# Patient Record
Sex: FEMALE | Race: BLACK OR AFRICAN AMERICAN | NOT HISPANIC OR LATINO | Employment: OTHER | URBAN - METROPOLITAN AREA
[De-identification: names, ages, dates, MRNs, and addresses within clinical notes are randomized per-mention and may not be internally consistent; named-entity substitution may affect disease eponyms.]

---

## 2017-07-17 ENCOUNTER — HOSPITAL ENCOUNTER (EMERGENCY)
Facility: HOSPITAL | Age: 72
Discharge: HOME | End: 2017-07-17
Attending: EMERGENCY MEDICINE
Payer: MEDICARE

## 2017-07-17 VITALS
SYSTOLIC BLOOD PRESSURE: 163 MMHG | WEIGHT: 178 LBS | OXYGEN SATURATION: 98 % | HEART RATE: 63 BPM | RESPIRATION RATE: 18 BRPM | BODY MASS INDEX: 30.39 KG/M2 | TEMPERATURE: 98 F | DIASTOLIC BLOOD PRESSURE: 81 MMHG | HEIGHT: 64 IN

## 2017-07-17 DIAGNOSIS — G89.29 CHRONIC LEFT SHOULDER PAIN: ICD-10-CM

## 2017-07-17 DIAGNOSIS — R07.89 LEFT-SIDED CHEST WALL PAIN: ICD-10-CM

## 2017-07-17 DIAGNOSIS — M25.512 CHRONIC LEFT SHOULDER PAIN: ICD-10-CM

## 2017-07-17 DIAGNOSIS — M25.512 ACUTE PAIN OF LEFT SHOULDER: ICD-10-CM

## 2017-07-17 DIAGNOSIS — E78.5 HYPERLIPIDEMIA, UNSPECIFIED HYPERLIPIDEMIA TYPE: ICD-10-CM

## 2017-07-17 DIAGNOSIS — M54.6 LEFT-SIDED THORACIC BACK PAIN, UNSPECIFIED CHRONICITY: Primary | ICD-10-CM

## 2017-07-17 DIAGNOSIS — I10 ESSENTIAL HYPERTENSION: ICD-10-CM

## 2017-07-17 LAB
ALBUMIN SERPL BCP-MCNC: 3.9 G/DL
ALP SERPL-CCNC: 53 U/L
ALT SERPL W/O P-5'-P-CCNC: 14 U/L
ANION GAP SERPL CALC-SCNC: 11 MMOL/L
AST SERPL-CCNC: 17 U/L
BASOPHILS # BLD AUTO: 0.03 K/UL
BASOPHILS NFR BLD: 0.5 %
BILIRUB SERPL-MCNC: 0.3 MG/DL
BNP SERPL-MCNC: 198 PG/ML
BUN SERPL-MCNC: 14 MG/DL
CALCIUM SERPL-MCNC: 9.4 MG/DL
CHLORIDE SERPL-SCNC: 104 MMOL/L
CO2 SERPL-SCNC: 24 MMOL/L
CREAT SERPL-MCNC: 1 MG/DL
DIFFERENTIAL METHOD: ABNORMAL
EOSINOPHIL # BLD AUTO: 0.1 K/UL
EOSINOPHIL NFR BLD: 1.3 %
ERYTHROCYTE [DISTWIDTH] IN BLOOD BY AUTOMATED COUNT: 14.6 %
EST. GFR  (AFRICAN AMERICAN): >60 ML/MIN/1.73 M^2
EST. GFR  (NON AFRICAN AMERICAN): 56.4 ML/MIN/1.73 M^2
GLUCOSE SERPL-MCNC: 96 MG/DL
HCT VFR BLD AUTO: 38.5 %
HGB BLD-MCNC: 12.3 G/DL
LYMPHOCYTES # BLD AUTO: 1.9 K/UL
LYMPHOCYTES NFR BLD: 30.5 %
MCH RBC QN AUTO: 26.6 PG
MCHC RBC AUTO-ENTMCNC: 31.9 %
MCV RBC AUTO: 83 FL
MONOCYTES # BLD AUTO: 1 K/UL
MONOCYTES NFR BLD: 15.6 %
NEUTROPHILS # BLD AUTO: 3.3 K/UL
NEUTROPHILS NFR BLD: 51.9 %
PLATELET # BLD AUTO: 175 K/UL
PMV BLD AUTO: 10.9 FL
POTASSIUM SERPL-SCNC: 3.2 MMOL/L
PROT SERPL-MCNC: 8.1 G/DL
RBC # BLD AUTO: 4.62 M/UL
SODIUM SERPL-SCNC: 139 MMOL/L
TROPONIN I SERPL DL<=0.01 NG/ML-MCNC: 0.01 NG/ML
WBC # BLD AUTO: 6.3 K/UL

## 2017-07-17 PROCEDURE — 99284 EMERGENCY DEPT VISIT MOD MDM: CPT | Mod: 25

## 2017-07-17 PROCEDURE — 25000003 PHARM REV CODE 250: Performed by: EMERGENCY MEDICINE

## 2017-07-17 PROCEDURE — 85025 COMPLETE CBC W/AUTO DIFF WBC: CPT

## 2017-07-17 PROCEDURE — 93005 ELECTROCARDIOGRAM TRACING: CPT

## 2017-07-17 PROCEDURE — 80053 COMPREHEN METABOLIC PANEL: CPT

## 2017-07-17 PROCEDURE — 84484 ASSAY OF TROPONIN QUANT: CPT

## 2017-07-17 PROCEDURE — 83880 ASSAY OF NATRIURETIC PEPTIDE: CPT

## 2017-07-17 PROCEDURE — 36000 PLACE NEEDLE IN VEIN: CPT

## 2017-07-17 PROCEDURE — 93010 ELECTROCARDIOGRAM REPORT: CPT | Mod: ,,, | Performed by: NUCLEAR MEDICINE

## 2017-07-17 PROCEDURE — 99900035 HC TECH TIME PER 15 MIN (STAT)

## 2017-07-17 RX ORDER — MELOXICAM 7.5 MG/1
7.5 TABLET ORAL DAILY
Qty: 60 TABLET | Refills: 1 | Status: SHIPPED | OUTPATIENT
Start: 2017-07-17 | End: 2022-07-30 | Stop reason: CLARIF

## 2017-07-17 RX ORDER — ASPIRIN 325 MG
325 TABLET ORAL
Status: COMPLETED | OUTPATIENT
Start: 2017-07-17 | End: 2017-07-17

## 2017-07-17 RX ADMIN — ASPIRIN 325 MG ORAL TABLET 325 MG: 325 PILL ORAL at 06:07

## 2017-07-18 NOTE — ED PROVIDER NOTES
"Encounter Date: 7/17/2017       History     Chief Complaint   Patient presents with    Back Pain     Pt states, "I have been having alot of pain in my back, chest to my throat area, and my shoulder area. It hurts to breath in and out." Onset approx 2-3 days ago. Pain described as soreness.     Chest Pain     Patient reports a chief complaint of upper back pain with radiation to the LEFT shoulder.  CP is denied.  This back pain is isolated to the left, upper back.  There has been no trauma.  Onset was noted at 2-3 days ago.  There is no numbness, weakness, incontinence reported.  Patient has not attempted treatment at home with over-the-counter medications.  Urinary complaints are denied.          Review of patient's allergies indicates:  No Known Allergies  Past Medical History:   Diagnosis Date    Anemia     Anxiety     Bronchitis     Hyperlipidemia     Hypertension      Past Surgical History:   Procedure Laterality Date    TUBAL LIGATION      VAGINAL PROLAPSE REPAIR  09/10/2013    Uterus prolapse/ [facility]     Family History   Problem Relation Age of Onset    Stroke Brother      Social History   Substance Use Topics    Smoking status: Never Smoker    Smokeless tobacco: Never Used    Alcohol use Yes      Comment: occasional      Review of Systems   Constitutional: Negative for chills and fever.   HENT: Negative for congestion and rhinorrhea.    Respiratory: Negative for cough, chest tightness, shortness of breath and wheezing.    Cardiovascular: Negative for chest pain, palpitations and leg swelling.   Gastrointestinal: Negative for abdominal pain, constipation, diarrhea, nausea and vomiting.   Genitourinary: Negative for dysuria, frequency, urgency, vaginal bleeding and vaginal discharge.   Musculoskeletal: Positive for back pain.   Skin: Negative for color change and rash.   Allergic/Immunologic: Negative for immunocompromised state.   Neurological: Negative for dizziness, weakness and numbness. "   Hematological: Negative for adenopathy. Does not bruise/bleed easily.   All other systems reviewed and are negative.      Physical Exam     Initial Vitals   BP Pulse Resp Temp SpO2   07/17/17 1828 07/17/17 1828 07/17/17 1828 07/17/17 1829 07/17/17 1828   (!) 199/106 69 18 98.2 °F (36.8 °C) 98 %      MAP       07/17/17 1828       137         Physical Exam    Nursing note and vitals reviewed.  Constitutional: She appears well-developed and well-nourished. She is not diaphoretic. No distress.   HENT:   Head: Normocephalic and atraumatic.   Right Ear: External ear normal.   Left Ear: External ear normal.   Nose: Nose normal.   Mouth/Throat: Oropharynx is clear and moist.   Eyes: Conjunctivae and EOM are normal. Pupils are equal, round, and reactive to light. No scleral icterus.   Neck: Neck supple. No tracheal deviation present. No JVD present.   Cardiovascular: Normal rate, regular rhythm, normal heart sounds and intact distal pulses. Exam reveals no gallop and no friction rub.    No murmur heard.  Pulmonary/Chest: Breath sounds normal. No respiratory distress. She has no wheezes. She has no rhonchi. She has no rales.   Abdominal: Soft. Bowel sounds are normal. She exhibits no distension. There is no tenderness.   Musculoskeletal: Normal range of motion. She exhibits no edema.   Neurological: She is alert and oriented to person, place, and time. She has normal strength. No cranial nerve deficit or sensory deficit.   Skin: Skin is warm and dry. No rash noted.   Psychiatric: She has a normal mood and affect. Her behavior is normal.         ED Course   Procedures  Labs Reviewed   CBC W/ AUTO DIFFERENTIAL - Abnormal; Notable for the following:        Result Value    MCH 26.6 (*)     MCHC 31.9 (*)     RDW 14.6 (*)     Mono% 15.6 (*)     All other components within normal limits   B-TYPE NATRIURETIC PEPTIDE - Abnormal; Notable for the following:      (*)     All other components within normal limits   COMPREHENSIVE  METABOLIC PANEL - Abnormal; Notable for the following:     Potassium 3.2 (*)     Alkaline Phosphatase 53 (*)     eGFR if non  56.4 (*)     All other components within normal limits   TROPONIN I     EKG Readings: (Independently Interpreted)   Initial Reading: No STEMI. Rhythm: Normal Sinus Rhythm. Heart Rate: 61. Ectopy: No Ectopy. Conduction: Normal. Axis: Left Axis Deviation.          Medical Decision Making:   Differential Diagnosis:   MILAGROS Score                          Age >/= 65   Yes  >/=3 Risk Factors  No       FH CAD    No       HTN    Yes        HLD    Yes       DM     No       Current smoker   No  Known CAD   No  ASA use in past 7days No  Severe angina   No  ST elevation   No  Elevated cardiac markers No    ED Management:  All historical, clinical, radiographic, and laboratory findings were reviewed with the patient in detail.  All remaining questions and concerns were addressed at that time.  Patient has been counseled regarding the need for follow-up as well as the indication to return to the emergency room should new or worrisome developments occur.  Guanaco Sandoval MD      Imaging Results          X-Ray Chest PA And Lateral (Final result)  Result time 07/17/17 19:57:54    Final result by Mike Barclay MD (07/17/17 19:57:54)                 Impression:     No acute process.      Electronically signed by: MIKE BARCLAY  Date:     07/17/17  Time:    19:57              Narrative:    Chest x-ray 2 views    Indication:R07.89 Other chest pain;    Findings: Comparison study 7/17/2017 at 1843 hours.  Improved depth of inspiration.  The lungs are clear.  Previously seen questionable left basilar opacity has resolved.  Normal size heart.  Moderate aortic tortuosity.  No congestion.                             X-Ray Chest AP Portable (Final result)  Result time 07/17/17 19:11:45    Final result by Mike Barclay MD (07/17/17 19:11:45)                 Impression:     See  above.      Electronically signed by: MIKE GAXIOLA  Date:     07/17/17  Time:    19:11              Narrative:    Chest x-ray single view    Indication: Chest pain.    Findings: No prior study.  Shallow breath.  Normal size heart.  Questionable infiltrate or atelectasis left lung base.  Otherwise, the lungs are clear.                                             ED Course     Clinical Impression:   The primary encounter diagnosis was Left-sided thoracic back pain, unspecified chronicity. Diagnoses of Left-sided chest wall pain, Essential hypertension, Hyperlipidemia, unspecified hyperlipidemia type, Chronic left shoulder pain, and Acute pain of left shoulder were also pertinent to this visit.                           Guanaco Sandoval MD  07/18/17 0358

## 2017-07-25 ENCOUNTER — HOSPITAL ENCOUNTER (OUTPATIENT)
Dept: RADIOLOGY | Facility: HOSPITAL | Age: 72
Discharge: HOME | End: 2017-07-25
Attending: FAMILY MEDICINE
Payer: MEDICARE

## 2017-07-25 DIAGNOSIS — M54.50 LOW BACK PAIN: ICD-10-CM

## 2017-07-25 DIAGNOSIS — M54.50 LOW BACK PAIN: Primary | ICD-10-CM

## 2017-07-25 PROCEDURE — 72070 X-RAY EXAM THORAC SPINE 2VWS: CPT | Mod: 26,,, | Performed by: RADIOLOGY

## 2017-07-25 PROCEDURE — 72040 X-RAY EXAM NECK SPINE 2-3 VW: CPT | Mod: 26,,, | Performed by: RADIOLOGY

## 2017-07-25 PROCEDURE — 72040 X-RAY EXAM NECK SPINE 2-3 VW: CPT | Mod: TC,PO

## 2017-07-25 PROCEDURE — 72070 X-RAY EXAM THORAC SPINE 2VWS: CPT | Mod: TC,PO

## 2018-05-18 ENCOUNTER — HOSPITAL ENCOUNTER (OUTPATIENT)
Dept: RADIOLOGY | Facility: HOSPITAL | Age: 73
Discharge: HOME | End: 2018-05-18
Attending: INTERNAL MEDICINE
Payer: MEDICARE

## 2018-05-18 DIAGNOSIS — M25.559 HIP PAIN: ICD-10-CM

## 2018-05-18 DIAGNOSIS — M54.50 LOW BACK PAIN, NON-SPECIFIC: ICD-10-CM

## 2018-05-18 DIAGNOSIS — M25.559 HIP PAIN: Primary | ICD-10-CM

## 2018-05-18 PROCEDURE — 72100 X-RAY EXAM L-S SPINE 2/3 VWS: CPT | Mod: TC,PO

## 2018-05-18 PROCEDURE — 73521 X-RAY EXAM HIPS BI 2 VIEWS: CPT | Mod: TC,PO

## 2018-05-18 PROCEDURE — 72100 X-RAY EXAM L-S SPINE 2/3 VWS: CPT | Mod: 26,,, | Performed by: RADIOLOGY

## 2018-05-18 PROCEDURE — 73521 X-RAY EXAM HIPS BI 2 VIEWS: CPT | Mod: 26,,, | Performed by: RADIOLOGY

## 2018-08-15 DIAGNOSIS — Z01.818 PREOP EXAMINATION: Primary | ICD-10-CM

## 2018-08-21 ENCOUNTER — LAB VISIT (OUTPATIENT)
Dept: LAB | Facility: HOSPITAL | Age: 73
End: 2018-08-21
Attending: INTERNAL MEDICINE
Payer: MEDICARE

## 2018-08-21 ENCOUNTER — HOSPITAL ENCOUNTER (OUTPATIENT)
Dept: CARDIOLOGY | Facility: CLINIC | Age: 73
Discharge: HOME | End: 2018-08-21
Payer: MEDICARE

## 2018-08-21 DIAGNOSIS — Z01.818 PREOP EXAMINATION: ICD-10-CM

## 2018-08-21 DIAGNOSIS — Z01.818 PREOP EXAMINATION: Primary | ICD-10-CM

## 2018-08-21 LAB
BASOPHILS # BLD AUTO: 0.03 K/UL
BASOPHILS NFR BLD: 0.6 %
DIFFERENTIAL METHOD: ABNORMAL
EOSINOPHIL # BLD AUTO: 0.1 K/UL
EOSINOPHIL NFR BLD: 2.3 %
ERYTHROCYTE [DISTWIDTH] IN BLOOD BY AUTOMATED COUNT: 14.6 %
HCT VFR BLD AUTO: 36.3 %
HGB BLD-MCNC: 11.8 G/DL
LYMPHOCYTES # BLD AUTO: 1.5 K/UL
LYMPHOCYTES NFR BLD: 30 %
MCH RBC QN AUTO: 27 PG
MCHC RBC AUTO-ENTMCNC: 32.5 G/DL
MCV RBC AUTO: 83 FL
MONOCYTES # BLD AUTO: 0.8 K/UL
MONOCYTES NFR BLD: 15.8 %
NEUTROPHILS # BLD AUTO: 2.5 K/UL
NEUTROPHILS NFR BLD: 51.1 %
PLATELET # BLD AUTO: 187 K/UL
PMV BLD AUTO: 10.6 FL
RBC # BLD AUTO: 4.37 M/UL
WBC # BLD AUTO: 4.87 K/UL

## 2018-08-21 PROCEDURE — 36415 COLL VENOUS BLD VENIPUNCTURE: CPT | Mod: PO

## 2018-08-21 PROCEDURE — 85025 COMPLETE CBC W/AUTO DIFF WBC: CPT | Mod: PO

## 2018-08-21 PROCEDURE — 93010 ELECTROCARDIOGRAM REPORT: CPT | Mod: 76,S$GLB,, | Performed by: INTERNAL MEDICINE

## 2020-10-29 ENCOUNTER — CLINICAL SUPPORT (OUTPATIENT)
Dept: OTHER | Facility: CLINIC | Age: 75
End: 2020-10-29

## 2020-10-29 DIAGNOSIS — Z00.8 ENCOUNTER FOR OTHER GENERAL EXAMINATION: ICD-10-CM

## 2020-10-31 VITALS — BODY MASS INDEX: 31.53 KG/M2 | HEIGHT: 63 IN

## 2020-10-31 LAB
HDLC SERPL-MCNC: 56 MG/DL
POC CHOLESTEROL, LDL (DOCK): 127 MG/DL
POC CHOLESTEROL, TOTAL: 195 MG/DL
POC GLUCOSE, FASTING: 77 MG/DL (ref 60–110)
TRIGL SERPL-MCNC: 60 MG/DL

## 2021-06-26 ENCOUNTER — HOSPITAL ENCOUNTER (EMERGENCY)
Facility: HOSPITAL | Age: 76
Discharge: HOME | End: 2021-06-26
Attending: EMERGENCY MEDICINE
Payer: MEDICARE

## 2021-06-26 VITALS
DIASTOLIC BLOOD PRESSURE: 90 MMHG | HEIGHT: 64 IN | RESPIRATION RATE: 18 BRPM | TEMPERATURE: 98 F | HEART RATE: 59 BPM | OXYGEN SATURATION: 100 % | BODY MASS INDEX: 27.55 KG/M2 | SYSTOLIC BLOOD PRESSURE: 198 MMHG | WEIGHT: 161.38 LBS

## 2021-06-26 DIAGNOSIS — S00.83XA CONTUSION OF FACE, INITIAL ENCOUNTER: ICD-10-CM
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
## 2021-06-26 PROCEDURE — 99284 EMERGENCY DEPT VISIT MOD MDM: CPT | Mod: 25,ER

## 2021-10-18 ENCOUNTER — CLINICAL SUPPORT (OUTPATIENT)
Dept: OTHER | Facility: CLINIC | Age: 76
End: 2021-10-18
Payer: MEDICARE

## 2021-10-18 DIAGNOSIS — Z00.8 ENCOUNTER FOR OTHER GENERAL EXAMINATION: ICD-10-CM

## 2021-10-19 VITALS — HEIGHT: 64 IN | BODY MASS INDEX: 27.7 KG/M2

## 2021-10-19 LAB
GLUCOSE SERPL-MCNC: 76 MG/DL (ref 60–140)
HDLC SERPL-MCNC: 59 MG/DL
POC CHOLESTEROL, LDL (DOCK): 97 MG/DL
POC CHOLESTEROL, TOTAL: 167 MG/DL
TRIGL SERPL-MCNC: 52 MG/DL

## 2022-03-08 ENCOUNTER — LAB VISIT (OUTPATIENT)
Dept: LAB | Facility: HOSPITAL | Age: 77
End: 2022-03-08
Attending: INTERNAL MEDICINE
Payer: MEDICARE

## 2022-03-08 DIAGNOSIS — E78.2 MIXED HYPERLIPIDEMIA: ICD-10-CM

## 2022-03-08 DIAGNOSIS — I10 ESSENTIAL HYPERTENSION, MALIGNANT: Primary | ICD-10-CM

## 2022-03-08 DIAGNOSIS — I10 ESSENTIAL HYPERTENSION, MALIGNANT: ICD-10-CM

## 2022-03-08 LAB
ANION GAP SERPL CALC-SCNC: 9 MMOL/L (ref 8–16)
BUN SERPL-MCNC: 28 MG/DL (ref 8–23)
CALCIUM SERPL-MCNC: 9.6 MG/DL (ref 8.7–10.5)
CHLORIDE SERPL-SCNC: 103 MMOL/L (ref 95–110)
CO2 SERPL-SCNC: 29 MMOL/L (ref 23–29)
CREAT SERPL-MCNC: 1.1 MG/DL (ref 0.5–1.4)
EST. GFR  (AFRICAN AMERICAN): 56 ML/MIN/1.73 M^2
EST. GFR  (NON AFRICAN AMERICAN): 48.5 ML/MIN/1.73 M^2
GLUCOSE SERPL-MCNC: 81 MG/DL (ref 70–110)
POTASSIUM SERPL-SCNC: 3.7 MMOL/L (ref 3.5–5.1)
SODIUM SERPL-SCNC: 141 MMOL/L (ref 136–145)
TSH SERPL DL<=0.005 MIU/L-ACNC: 2.33 UIU/ML (ref 0.4–4)

## 2022-03-08 PROCEDURE — 36415 COLL VENOUS BLD VENIPUNCTURE: CPT | Mod: PO | Performed by: INTERNAL MEDICINE

## 2022-03-08 PROCEDURE — 80061 LIPID PANEL: CPT | Performed by: INTERNAL MEDICINE

## 2022-03-08 PROCEDURE — 80048 BASIC METABOLIC PNL TOTAL CA: CPT | Mod: PO | Performed by: INTERNAL MEDICINE

## 2022-03-08 PROCEDURE — 83036 HEMOGLOBIN GLYCOSYLATED A1C: CPT | Performed by: INTERNAL MEDICINE

## 2022-03-08 PROCEDURE — 84443 ASSAY THYROID STIM HORMONE: CPT | Mod: PO | Performed by: INTERNAL MEDICINE

## 2022-03-09 LAB
CHOLEST SERPL-MCNC: 173 MG/DL (ref 120–199)
CHOLEST/HDLC SERPL: 2.9 {RATIO} (ref 2–5)
ESTIMATED AVG GLUCOSE: 108 MG/DL (ref 68–131)
HBA1C MFR BLD: 5.4 % (ref 4–5.6)
HDLC SERPL-MCNC: 60 MG/DL (ref 40–75)
HDLC SERPL: 34.7 % (ref 20–50)
LDLC SERPL CALC-MCNC: 100.6 MG/DL (ref 63–159)
NONHDLC SERPL-MCNC: 113 MG/DL
TRIGL SERPL-MCNC: 62 MG/DL (ref 30–150)

## 2022-04-02 ENCOUNTER — HOSPITAL ENCOUNTER (EMERGENCY)
Facility: HOSPITAL | Age: 77
Discharge: HOME | End: 2022-04-02
Attending: EMERGENCY MEDICINE
Payer: MEDICARE

## 2022-04-02 VITALS
SYSTOLIC BLOOD PRESSURE: 139 MMHG | WEIGHT: 150 LBS | DIASTOLIC BLOOD PRESSURE: 77 MMHG | OXYGEN SATURATION: 96 % | BODY MASS INDEX: 25.75 KG/M2 | TEMPERATURE: 98 F | RESPIRATION RATE: 16 BRPM | HEART RATE: 56 BPM

## 2022-04-02 DIAGNOSIS — I10 PRIMARY HYPERTENSION: ICD-10-CM

## 2022-04-02 DIAGNOSIS — R04.0 EPISTAXIS: Primary | ICD-10-CM

## 2022-04-02 PROCEDURE — 25000003 PHARM REV CODE 250: Mod: ER | Performed by: EMERGENCY MEDICINE

## 2022-04-02 PROCEDURE — 99283 EMERGENCY DEPT VISIT LOW MDM: CPT | Mod: ER

## 2022-04-02 RX ORDER — CLONIDINE HYDROCHLORIDE 0.2 MG/1
0.2 TABLET ORAL
Status: COMPLETED | OUTPATIENT
Start: 2022-04-02 | End: 2022-04-02

## 2022-04-02 RX ADMIN — CLONIDINE HYDROCHLORIDE 0.2 MG: 0.2 TABLET ORAL at 10:04

## 2022-04-02 NOTE — ED PROVIDER NOTES
Encounter Date: 4/2/2022       History     Chief Complaint   Patient presents with    Epistaxis     Just stopped on its own. Had approx 1 nose bleed a month since Feb. Pt states it does not last too long. Just takes a daily 81mg asa.     The history is provided by the patient.   Epistaxis   This is a recurrent problem. The current episode started just prior to arrival. The problem has been resolved. The problem is associated with an unknown factor. The bleeding has been from the left nare. She has tried applying pressure for the symptoms. The treatment provided significant relief. Her past medical history is significant for hypertension and frequent nosebleeds.     Review of patient's allergies indicates:  No Known Allergies  Past Medical History:   Diagnosis Date    Anemia     Anxiety     Bronchitis     Hyperlipidemia     Hypertension      Past Surgical History:   Procedure Laterality Date    TUBAL LIGATION      VAGINAL PROLAPSE REPAIR  09/10/2013    Uterus prolapse/ [facility]     Family History   Problem Relation Age of Onset    Stroke Brother      Social History     Tobacco Use    Smoking status: Never Smoker    Smokeless tobacco: Never Used   Substance Use Topics    Alcohol use: Yes     Comment: occasional     Drug use: No     Review of Systems   Constitutional: Negative for fever.   HENT: Positive for nosebleeds. Negative for sore throat.    Respiratory: Negative for shortness of breath.    Cardiovascular: Negative for chest pain.   Gastrointestinal: Negative for nausea.   Genitourinary: Negative for dysuria.   Musculoskeletal: Negative for back pain.   Skin: Negative for rash.   Neurological: Negative for weakness.   Hematological: Does not bruise/bleed easily.       Physical Exam     Initial Vitals [04/02/22 1017]   BP Pulse Resp Temp SpO2   (!) 211/102 62 16 97.9 °F (36.6 °C) 97 %      MAP       --         Physical Exam    Nursing note and vitals reviewed.  Constitutional: She appears  well-developed and well-nourished. No distress.   HENT:   Head: Normocephalic and atraumatic.   Nose: No epistaxis.   Mouth/Throat: Oropharynx is clear and moist.   No active bleeding from either nares.   Eyes: Conjunctivae and EOM are normal. Pupils are equal, round, and reactive to light.   Neck: Neck supple.   Normal range of motion.  Cardiovascular: Normal rate, regular rhythm and normal heart sounds. Exam reveals no gallop and no friction rub.    No murmur heard.  Pulmonary/Chest: Breath sounds normal. No respiratory distress. She has no wheezes. She has no rhonchi. She has no rales.   Abdominal: Abdomen is soft. Bowel sounds are normal. She exhibits no distension and no mass. There is no abdominal tenderness. There is no rebound and no guarding.   Musculoskeletal:         General: No tenderness or edema. Normal range of motion.      Cervical back: Normal range of motion and neck supple.     Neurological: She is alert and oriented to person, place, and time. She has normal strength.   Skin: Skin is warm and dry. No rash noted.   Psychiatric: She has a normal mood and affect. Thought content normal.         ED Course   Procedures  Labs Reviewed - No data to display       Imaging Results    None          Medications   cloNIDine tablet 0.2 mg (0.2 mg Oral Given 4/2/22 1026)                          Clinical Impression:   Final diagnoses:  [R04.0] Epistaxis (Primary)  [I10] Primary hypertension          ED Disposition Condition    Discharge Stable        ED Prescriptions     None        Follow-up Information    None          Tito Chilel MD  04/02/22 7082

## 2022-06-22 ENCOUNTER — OFFICE VISIT (OUTPATIENT)
Dept: PODIATRY | Facility: CLINIC | Age: 77
End: 2022-06-22
Payer: MEDICARE

## 2022-06-22 VITALS — BODY MASS INDEX: 25.63 KG/M2 | WEIGHT: 150.13 LBS | HEIGHT: 64 IN

## 2022-06-22 DIAGNOSIS — B35.1 DERMATOPHYTOSIS OF NAIL: Primary | ICD-10-CM

## 2022-06-22 PROCEDURE — 1159F PR MEDICATION LIST DOCUMENTED IN MEDICAL RECORD: ICD-10-PCS | Mod: CPTII,S$GLB,, | Performed by: PODIATRIST

## 2022-06-22 PROCEDURE — 1159F MED LIST DOCD IN RCRD: CPT | Mod: CPTII,S$GLB,, | Performed by: PODIATRIST

## 2022-06-22 PROCEDURE — 1160F RVW MEDS BY RX/DR IN RCRD: CPT | Mod: CPTII,S$GLB,, | Performed by: PODIATRIST

## 2022-06-22 PROCEDURE — 1101F PR PT FALLS ASSESS DOC 0-1 FALLS W/OUT INJ PAST YR: ICD-10-PCS | Mod: CPTII,S$GLB,, | Performed by: PODIATRIST

## 2022-06-22 PROCEDURE — 3288F PR FALLS RISK ASSESSMENT DOCUMENTED: ICD-10-PCS | Mod: CPTII,S$GLB,, | Performed by: PODIATRIST

## 2022-06-22 PROCEDURE — 99203 OFFICE O/P NEW LOW 30 MIN: CPT | Mod: S$GLB,,, | Performed by: PODIATRIST

## 2022-06-22 PROCEDURE — 1125F AMNT PAIN NOTED PAIN PRSNT: CPT | Mod: CPTII,S$GLB,, | Performed by: PODIATRIST

## 2022-06-22 PROCEDURE — 1101F PT FALLS ASSESS-DOCD LE1/YR: CPT | Mod: CPTII,S$GLB,, | Performed by: PODIATRIST

## 2022-06-22 PROCEDURE — 99999 PR PBB SHADOW E&M-EST. PATIENT-LVL III: CPT | Mod: PBBFAC,,, | Performed by: PODIATRIST

## 2022-06-22 PROCEDURE — 99999 PR PBB SHADOW E&M-EST. PATIENT-LVL III: ICD-10-PCS | Mod: PBBFAC,,, | Performed by: PODIATRIST

## 2022-06-22 PROCEDURE — 1125F PR PAIN SEVERITY QUANTIFIED, PAIN PRESENT: ICD-10-PCS | Mod: CPTII,S$GLB,, | Performed by: PODIATRIST

## 2022-06-22 PROCEDURE — 99203 PR OFFICE/OUTPT VISIT, NEW, LEVL III, 30-44 MIN: ICD-10-PCS | Mod: S$GLB,,, | Performed by: PODIATRIST

## 2022-06-22 PROCEDURE — 3288F FALL RISK ASSESSMENT DOCD: CPT | Mod: CPTII,S$GLB,, | Performed by: PODIATRIST

## 2022-06-22 PROCEDURE — 1160F PR REVIEW ALL MEDS BY PRESCRIBER/CLIN PHARMACIST DOCUMENTED: ICD-10-PCS | Mod: CPTII,S$GLB,, | Performed by: PODIATRIST

## 2022-07-04 NOTE — PROGRESS NOTES
"Subjective:     Patient ID: Janet Hamilton is a 76 y.o. female.    Chief Complaint: Nail Problem (C/o toenail problem, rates pain 6/10, nondiabetic, wearing socks and tennis shoes, last seen PCP  on  06/07/2022.)    [place] is a 76 y.o. female who presents to the clinic complaining of thick and discolored toenails on both feet. [place] is inquiring about treatment options.    Patient Active Problem List   Diagnosis    Essential hypertension, benign    Bilateral bunions    Hyperlipemia    Overactive bladder       Medication List with Changes/Refills   Current Medications    AMLODIPINE (NORVASC) 10 MG TABLET    Take 1 tablet (10 mg total) by mouth once daily.    LOSARTAN-HYDROCHLOROTHIAZIDE 100-25 MG (HYZAAR) 100-25 MG PER TABLET    Take 1 tablet by mouth once daily.    MELOXICAM (MOBIC) 7.5 MG TABLET    Take 1 tablet (7.5 mg total) by mouth once daily at 6am.    METOPROLOL TARTRATE (LOPRESSOR) 100 MG TABLET    Take 1 tablet (100 mg total) by mouth once daily.    SIMVASTATIN (ZOCOR) 20 MG TABLET    Take 1 tablet (20 mg total) by mouth every evening.    VERAPAMIL (CALAN-SR) 240 MG CR TABLET    Take 1 tablet (240 mg total) by mouth once daily.       Review of patient's allergies indicates:  No Known Allergies    Past Surgical History:   Procedure Laterality Date    TUBAL LIGATION      VAGINAL PROLAPSE REPAIR  09/10/2013    Uterus prolapse/ [facility]       Family History   Problem Relation Age of Onset    Stroke Brother        Social History     Socioeconomic History    Marital status:      Spouse name: Solomon    Number of children: 3   Occupational History    Occupation: Retired   Tobacco Use    Smoking status: Never Smoker    Smokeless tobacco: Never Used   Substance and Sexual Activity    Alcohol use: Yes     Comment: occasional     Drug use: No    Sexual activity: Not Currently       Vitals:    06/22/22 1538   Weight: 68.1 kg (150 lb 2.1 oz)   Height: 5' 4" (1.626 m)   PainSc:   6 "   PainLoc: Toe       Hemoglobin A1C   Date Value Ref Range Status   03/08/2022 5.4 4.0 - 5.6 % Final     Comment:     ADA Screening Guidelines:  5.7-6.4%  Consistent with prediabetes  >or=6.5%  Consistent with diabetes    High levels of fetal hemoglobin interfere with the HbA1C  assay. Heterozygous hemoglobin variants (HbS, HgC, etc)do  not significantly interfere with this assay.   However, presence of multiple variants may affect accuracy.         Review of Systems   Constitutional: Negative for chills and fever.   Respiratory: Negative for shortness of breath.    Cardiovascular: Negative for chest pain, palpitations, orthopnea, claudication and leg swelling.   Gastrointestinal: Negative for diarrhea, nausea and vomiting.   Musculoskeletal: Negative for joint pain.   Skin: Negative for rash.   Neurological: Negative for dizziness, tingling, sensory change, focal weakness and weakness.   Psychiatric/Behavioral: Negative.          Objective:      PHYSICAL EXAM: Apperance: Alert and orient in no distress,well developed, and with good attention to grooming and body habits  LOWER EXTREMITY EXAM:  VASCULAR: Dorsalis pedis pulses 2/4 bilateral and Posterior Tibial pulses 2/4 bilateral. Capillary fill time <4 seconds bilateral. No edema observed bilateral. Varicosities absent bilateral. Skin temperature of the lower extremities is warm to warm, proximal to distal. Hair growth WNL bilateral.  DERMATOLOGICAL: No skin rashes, subcutaneous nodules, lesions, or ulcers observed bilateral. Nails 1,2,5 bilateral thickened, and discolored with subungual debris. Webspaces 1,2,3,4 bilateral clean, dry and without evidence of break in skin integrity.  NEUROLOGICAL: Light touch, sharp-dull, proprioception all present and equal bilaterally.     MUSCULOSKELETAL: Muscle strength is 5/5 for foot inverters, everters, plantarflexors, and dorsiflexors. Muscle tone is normal. Negative pain on palpation of nails bilateral.         Assessment:        ICD-10-CM ICD-9-CM   1. Dermatophytosis of nail  B35.1 110.1       Plan:   Dermatophytosis of nail      I counseled the patient on her conditions, regarding findings of my examination, my impressions, and usual treatment plan.   Patient instructed to spray all shoes with Lysol disinfectant spray and let dry before wearing. Patient instructed to wash all socks in hot water and bleach.  Discuss treatment options for nail fungus.  I explained that fungus lives in a warm dark moist environment and therefore patient should make every attempt to keep feet clean and dry.  We discussed drying feet thoroughly after shower particularly between the toes and then applying powder between the toes and in the shoes.    For fungal toenails I prescribed topical medication to be used daily for up to a year.  We also discussed oral Lamisil but I did not recommend it as a first line of treatment since it is an internal medicine that may potentially have side effects, including liver problems.   Patient to return as needed.          Vance Davis DPM  [facility]

## 2022-07-30 ENCOUNTER — HOSPITAL ENCOUNTER (EMERGENCY)
Facility: HOSPITAL | Age: 77
Discharge: HOME | End: 2022-07-30
Attending: EMERGENCY MEDICINE
Payer: MEDICARE

## 2022-07-30 VITALS
HEART RATE: 55 BPM | RESPIRATION RATE: 20 BRPM | OXYGEN SATURATION: 98 % | TEMPERATURE: 98 F | DIASTOLIC BLOOD PRESSURE: 88 MMHG | SYSTOLIC BLOOD PRESSURE: 178 MMHG

## 2022-07-30 DIAGNOSIS — R03.0 ELEVATED BLOOD PRESSURE READING: ICD-10-CM

## 2022-07-30 DIAGNOSIS — I10 HYPERTENSION: ICD-10-CM

## 2022-07-30 LAB
BILIRUB UR QL STRIP: NEGATIVE
CLARITY UR REFRACT.AUTO: CLEAR
COLOR UR AUTO: YELLOW
GLUCOSE UR QL STRIP: NEGATIVE
HGB UR QL STRIP: ABNORMAL
KETONES UR QL STRIP: NEGATIVE
LEUKOCYTE ESTERASE UR QL STRIP: NEGATIVE
NITRITE UR QL STRIP: NEGATIVE
PH UR STRIP: 8 [PH] (ref 5–8)
PROT UR QL STRIP: NEGATIVE
SP GR UR STRIP: 1.01 (ref 1–1.03)
URN SPEC COLLECT METH UR: ABNORMAL
UROBILINOGEN UR STRIP-ACNC: NEGATIVE EU/DL

## 2022-07-30 PROCEDURE — 81003 URINALYSIS AUTO W/O SCOPE: CPT | Mod: ER | Performed by: EMERGENCY MEDICINE

## 2022-07-30 PROCEDURE — 93005 ELECTROCARDIOGRAM TRACING: CPT | Mod: ER

## 2022-07-30 PROCEDURE — 93010 ELECTROCARDIOGRAM REPORT: CPT | Mod: ,,, | Performed by: INTERNAL MEDICINE

## 2022-07-30 PROCEDURE — 99284 EMERGENCY DEPT VISIT MOD MDM: CPT | Mod: ER

## 2022-07-30 PROCEDURE — 93010 EKG 12-LEAD: ICD-10-PCS | Mod: ,,, | Performed by: INTERNAL MEDICINE

## 2022-07-30 NOTE — ED NOTES
Spoke with several family members and friends via phone per pts request. 2 friends at bedside at this time.

## 2022-07-30 NOTE — ED PROVIDER NOTES
History     Chief Complaint   Patient presents with    Hypertension     Pts  passed away this morning at home--pts blood pressure was elevated. Denies CP, SOB or symptoms. Pt took all home medications before coming to ER. Tearful.        Review of patient's allergies indicates:  No Known Allergies    History of Present Illness   HPI    7/30/2022, 9:01 AM   The history is provided by the patient    Janet Hamilton is a 76 y.o. female presenting to the ED for elevated blood pressure reading.  Patient's spouse had passed away in sleep.  [facility] service was on scene.  Reportedly she had systolic blood pressure of 220.  She had taken her home blood pressure medications.  She states emotionally she is heart broken.  But physically she is okay.  Patient denies any numbness or tingling, headache, chest pain, chest pressure, shortness of breath, difficulty breathing, indigestion, back pain, abdominal pain, balance issues, difficulty swallowing, difficulty talking.       Arrival mode:  [facility]    PCP: Omid Morris MD     Allergies:  Review of patient's allergies indicates:  No Known Allergies    Past Medical History:  Past Medical History:   Diagnosis Date    Anemia     Anxiety     Bronchitis     Enlarged heart     Hyperlipidemia     Hypertension        Past Surgical History:  Past Surgical History:   Procedure Laterality Date    TUBAL LIGATION      VAGINAL PROLAPSE REPAIR  09/10/2013    Uterus prolapse/ [facility]         Family History:  Family History   Problem Relation Age of Onset    Stroke Brother        Social History:  Social History     Tobacco Use    Smoking status: Never Smoker    Smokeless tobacco: Never Used   Substance and Sexual Activity    Alcohol use: Yes     Comment: occasional     Drug use: No    Sexual activity: Not Currently        Review of Systems   Review of Systems   Constitutional: Negative for fever.   HENT: Negative for sore throat.    Respiratory: Negative  for chest tightness and shortness of breath.    Cardiovascular: Negative for chest pain.   Gastrointestinal: Negative for nausea.   Genitourinary: Positive for difficulty urinating. Negative for decreased urine volume, dysuria, frequency and urgency.   Musculoskeletal: Negative for back pain.   Skin: Negative for rash.   Neurological: Negative for syncope, facial asymmetry, speech difficulty, weakness, light-headedness, numbness and headaches.   Hematological: Does not bruise/bleed easily.   Psychiatric/Behavioral:        (+) sad          Physical Exam     Initial Vitals   BP Pulse Resp Temp SpO2   07/30/22 0853 07/30/22 0853 07/30/22 0853 07/30/22 0927 07/30/22 0853   (!) 197/94 61 20 98.1 °F (36.7 °C) 98 %      MAP       --                 Physical Exam    Nursing Notes and Vital Signs Reviewed.  Constitutional: Patient is in no apparent distress. Well-developed and well-nourished.  Head: Atraumatic. Normocephalic.  Eyes: PERRL. EOM intact. Conjunctivae are not pale. No scleral icterus.  ENT: Mucous membranes are moist. Oropharynx is clear and symmetric.    Neck: Supple. Full ROM. No lymphadenopathy.  Cardiovascular: Regular rate. Regular rhythm. No murmurs, rubs, or gallops. Distal pulses are 2+ and symmetric.  Pulmonary/Chest: No respiratory distress. Clear to auscultation bilaterally. No wheezing or rales.  Abdominal: Soft and non-distended.  There is no tenderness.  No rebound, guarding, or rigidity. Good bowel sounds.  Genitourinary: No CVA tenderness  Musculoskeletal: Moves all extremities. No obvious deformities. No edema. No calf tenderness.  Skin: Warm and dry.  Neurological:  Alert, awake, and appropriate.  Normal speech.  No acute focal neurological deficits are appreciated.  Cranial nerves 2-12 intact.  No focal deficits.  NIH Stroke Scale equals 0.   Psychiatric: Normal affect. Good eye contact. Appropriate in content.     ED Course     ED Procedures:  Procedures    ED Vital Signs:  Vitals:     07/30/22 0853 07/30/22 0904 07/30/22 0927 07/30/22 1035   BP: (!) 197/94   (!) 178/88   Pulse: 61 61  (!) 55   Resp: 20   20   Temp:   98.1 °F (36.7 °C)    TempSrc:   Oral    SpO2: 98%   98%       Abnormal Lab Results:  Labs Reviewed   URINALYSIS, REFLEX TO URINE CULTURE - Abnormal; Notable for the following components:       Result Value    Occult Blood UA Trace (*)     All other components within normal limits    Narrative:     Specimen Source->Urine        All Lab Results:  Results for orders placed or performed during the hospital encounter of 07/30/22   Urinalysis, Reflex to Urine Culture Urine, Clean Catch    Specimen: Urine   Result Value Ref Range    Specimen UA Urine, Clean Catch     Color, UA Yellow Yellow, Straw, [place]    Appearance, UA Clear Clear    pH, UA 8.0 5.0 - 8.0    Specific Gravity, UA 1.010 1.005 - 1.030    Protein, UA Negative Negative    Glucose, UA Negative Negative    Ketones, UA Negative Negative    Bilirubin (UA) Negative Negative    Occult Blood UA Trace (A) Negative    Nitrite, UA Negative Negative    Urobilinogen, UA Negative <2.0 EU/dL    Leukocytes, UA Negative Negative         The EKG was ordered, reviewed, and independently interpreted by the ED provider.    ECG Results          EKG 12-lead (Preliminary result)  Result time 07/30/22 09:28:40    ED Interpretation by Kymberly Peck DO (07/30/22 09:28:40, [facility] Emergency Dept, Emergency Medicine)    EKG:  Rate of 68 beats per minute.  Incomplete right bundle-branch block.  Left axis deviation.  No ST segment elevation.  No STEMI.  This is compared to August 21, 2018 no acute changes.                              Imaging Results:  Imaging Results    None               The Emergency Provider reviewed the vital signs and test results, which are outlined above.     ED Discussion      10:40 AM Reassessment: Dr. Peck reassessed the pt.  The pt is resting comfortably and is NAD.  Pt states their sx have improved. Discussed  "test results, shared treatment plan, specific conditions for return, and the need for f/u.  Answered their questions at this time.  Pt understands and agrees to the plan.  The pt has remained hemodynamically stable through ED course and is stable for discharge.      I discussed with patient and/or family/caretaker that evaluation in the ED does not suggest any emergent or life threatening medical conditions requiring immediate intervention beyond what was provided in the ED, and I believe patient is safe for discharge.  Regardless, an unremarkable evaluation in the ED does not preclude the development or presence of a serious of life threatening condition. As such, patient was instructed to return immediately for any worsening or change in current symptoms.      ED Medication(s):  Medications - No data to display          MIPS Measures     Smoker? No     Hypertension: History of Hypertension: The patient has elevated blood pressure (higher than 120/80) while being treated in the ED but has a history of hypertension.       Medical Decision Making           Additional MDM:     NIH Stroke Scale:   Interval = baseline (upon arrival/admit)  Level of consciousness = 0 - alert  LOC questions = 0 - answers both correctly  LOC commands = 0 - performs both correctly  Best gaze = 0 - normal  Visual = 0 - no visual loss  Facial palsy = 0 - normal  Motor left arm =  0 - no drift  Motor right arm =  0 - no drift  Motor left leg = 0 - no drift  Motor right leg =  0 - no drift  Limb ataxia = 0 - absent  Sensory = 0 - normal  Best language = 0 - no aphasia  Dysarthria = 0 - normal articulation  Extinction and inattention = 0 - no neglect  NIH Stroke Scale Total = 0       MDM  Reviewed: vitals and nursing note  Interpretation: labs and ECG        Portions of this note may have been created with voice recognition software. Occasional "wrong-word" or "sound-a-like" substitutions may have occurred due to the inherent limitations of " voice recognition software. Please, read the note carefully and recognize, using context, where substitutions have occurred.            Clinical Impression       ICD-10-CM ICD-9-CM   1. Elevated blood pressure reading  R03.0 796.2   2. Hypertension  I10 401.9         ED Disposition       Disposition: Discharge to home  Patient condition: Stable    Medication List     Medication List      ASK your doctor about these medications    losartan-hydrochlorothiazide 100-25 mg 100-25 mg per tablet  Commonly known as: HYZAAR  Take 1 tablet by mouth once daily.     metoprolol tartrate 100 MG tablet  Commonly known as: LOPRESSOR  Take 1 tablet (100 mg total) by mouth once daily.     verapamiL 240 MG CR tablet  Commonly known as: CALAN-SR  Take 1 tablet (240 mg total) by mouth once daily.            ED Follow-up   Follow-up Information     Omid Morris MD In 2 days.    Specialty: Pathology  Why: Return to the emergency department for:  Chest pain, chest pressure, shortness of breath, difficulty breathing, numbness or weakness to 1 side, blurred vision, or other concerns.  Contact information:  [address] [place] [id number] [phone]                                  Kymberly Peck,   07/30/22 3195

## 2022-08-12 ENCOUNTER — HOSPITAL ENCOUNTER (EMERGENCY)
Facility: HOSPITAL | Age: 77
Discharge: HOME | End: 2022-08-12
Attending: EMERGENCY MEDICINE
Payer: MEDICARE

## 2022-08-12 VITALS
HEART RATE: 52 BPM | SYSTOLIC BLOOD PRESSURE: 194 MMHG | RESPIRATION RATE: 16 BRPM | OXYGEN SATURATION: 100 % | WEIGHT: 150 LBS | TEMPERATURE: 98 F | DIASTOLIC BLOOD PRESSURE: 87 MMHG | BODY MASS INDEX: 25.75 KG/M2

## 2022-08-12 DIAGNOSIS — I10 ESSENTIAL HYPERTENSION: ICD-10-CM

## 2022-08-12 DIAGNOSIS — R04.0 EPISTAXIS: Primary | ICD-10-CM

## 2022-08-12 PROCEDURE — 25000003 PHARM REV CODE 250: Mod: ER | Performed by: EMERGENCY MEDICINE

## 2022-08-12 PROCEDURE — 99283 EMERGENCY DEPT VISIT LOW MDM: CPT | Mod: 25,ER

## 2022-08-12 RX ORDER — LOSARTAN POTASSIUM 25 MG/1
100 TABLET ORAL
Status: COMPLETED | OUTPATIENT
Start: 2022-08-12 | End: 2022-08-12

## 2022-08-12 RX ORDER — METOPROLOL TARTRATE 25 MG/1
100 TABLET, FILM COATED ORAL
Status: COMPLETED | OUTPATIENT
Start: 2022-08-12 | End: 2022-08-12

## 2022-08-12 RX ORDER — HYDROCHLOROTHIAZIDE 25 MG/1
25 TABLET ORAL
Status: COMPLETED | OUTPATIENT
Start: 2022-08-12 | End: 2022-08-12

## 2022-08-12 RX ADMIN — METOPROLOL TARTRATE 100 MG: 25 TABLET, FILM COATED ORAL at 09:08

## 2022-08-12 RX ADMIN — HYDROCHLOROTHIAZIDE 25 MG: 25 TABLET ORAL at 09:08

## 2022-08-12 RX ADMIN — LOSARTAN POTASSIUM 100 MG: 25 TABLET, FILM COATED ORAL at 09:08

## 2022-08-12 NOTE — ED PROVIDER NOTES
Encounter Date: 8/12/2022       History     Chief Complaint   Patient presents with    Epistaxis     Left nare bleeding x20 min pta. No active bleeding on arrival. Has not taken HTN meds today, denies blood thinners.     Patient is 76-year-old female who presents today with complaints of epistaxis.  20 minutes.  Left nostril.  One prior episode.  Denies anticoagulation.  Patient does report a history of hypertension and has not taken her medications today.  She has been under significant stress.  Her  passed away this week.  Denies any chest pain, shortness of breath, and all other symptoms.        Review of patient's allergies indicates:  No Known Allergies  Past Medical History:   Diagnosis Date    Anemia     Anxiety     Bronchitis     Enlarged heart     Hyperlipidemia     Hypertension      Past Surgical History:   Procedure Laterality Date    TUBAL LIGATION      VAGINAL PROLAPSE REPAIR  09/10/2013    Uterus prolapse/ [facility]     Family History   Problem Relation Age of Onset    Stroke Brother      Social History     Tobacco Use    Smoking status: Never Smoker    Smokeless tobacco: Never Used   Substance Use Topics    Alcohol use: Yes     Comment: occasional     Drug use: No     Review of Systems   Constitutional: Negative for chills, diaphoresis and fever.   HENT: Negative for congestion, rhinorrhea and sore throat.         Epistaxis   Eyes: Negative for pain, redness and visual disturbance.   Respiratory: Negative for cough and shortness of breath.    Cardiovascular: Negative for chest pain, palpitations and leg swelling.   Gastrointestinal: Negative for abdominal distention, abdominal pain, blood in stool, constipation, diarrhea, nausea and vomiting.   Genitourinary: Negative for dysuria and hematuria.   Musculoskeletal: Negative for arthralgias and joint swelling.   Skin: Negative for rash and wound.   Neurological: Negative for seizures, syncope and headaches.   All other systems  reviewed and are negative.      Physical Exam     Initial Vitals [08/12/22 0837]   BP Pulse Resp Temp SpO2   (!) 216/100 62 16 97.8 °F (36.6 °C) 100 %      MAP       --         Physical Exam    Nursing note and vitals reviewed.  Constitutional: She appears well-developed and well-nourished. No distress.   HENT:   Head: Normocephalic and atraumatic.   Dry blood from left nostril.  No active bleeding   Eyes: Conjunctivae and EOM are normal. Pupils are equal, round, and reactive to light.   Neck: Neck supple. No tracheal deviation present.   Cardiovascular: Normal rate, regular rhythm, normal heart sounds and intact distal pulses.   Pulmonary/Chest: Breath sounds normal. No respiratory distress.   Abdominal: Abdomen is soft. She exhibits no distension. There is no abdominal tenderness. There is no rebound and no guarding.   Musculoskeletal:         General: No tenderness or edema. Normal range of motion.      Cervical back: Neck supple.     Neurological: She is alert and oriented to person, place, and time. GCS score is 15. GCS eye subscore is 4. GCS verbal subscore is 5. GCS motor subscore is 6.   No focal deficits   Skin: Skin is warm. No rash noted. No erythema.   Psychiatric: She has a normal mood and affect. Her behavior is normal.         ED Course   Procedures  Labs Reviewed - No data to display       Imaging Results    None          Medications   losartan tablet 100 mg (100 mg Oral Given 8/12/22 0914)   hydroCHLOROthiazide tablet 25 mg (25 mg Oral Given 8/12/22 0913)   metoprolol tartrate (LOPRESSOR) tablet 100 mg (100 mg Oral Given 8/12/22 0914)     Vitals:    08/12/22 0837 08/12/22 0904 08/12/22 0947   BP: (!) 216/100 (!) 191/83 (!) 194/87   BP Location: Left arm     Patient Position: Sitting     Pulse: 62 (!) 56 (!) 52   Resp: 16     Temp: 97.8 °F (36.6 °C)     TempSrc: Oral     SpO2: 100% 100% 100%   Weight: 68 kg (150 lb)                                Clinical Impression:   Final diagnoses:  [R04.0]  Epistaxis (Primary)  [I10] Essential hypertension          ED Disposition Condition    Discharge Stable        ED Prescriptions     None        Follow-up Information     Follow up With Specialties Details Why Contact Info    Omid Morris MD Pathology Call   [address] [facility] [address]  [facility] [address]  [phone]      [facility] - Emergency Dept Emergency Medicine  As needed, If symptoms worsen [address] [facility] [address]  [facility] [id number] [phone]           Reid Riley MD  08/12/22 7015

## 2022-09-23 ENCOUNTER — LAB VISIT (OUTPATIENT)
Dept: LAB | Facility: HOSPITAL | Age: 77
End: 2022-09-23
Attending: INTERNAL MEDICINE
Payer: MEDICARE

## 2022-09-23 DIAGNOSIS — I10 ESSENTIAL HYPERTENSION, MALIGNANT: ICD-10-CM

## 2022-09-23 DIAGNOSIS — E78.2 MIXED HYPERLIPIDEMIA: ICD-10-CM

## 2022-09-23 DIAGNOSIS — R73.03 DIABETES MELLITUS, LATENT: ICD-10-CM

## 2022-09-23 DIAGNOSIS — I10 ESSENTIAL HYPERTENSION, MALIGNANT: Primary | ICD-10-CM

## 2022-09-23 LAB
ANION GAP SERPL CALC-SCNC: 8 MMOL/L (ref 8–16)
BUN SERPL-MCNC: 19 MG/DL (ref 8–23)
CALCIUM SERPL-MCNC: 9.6 MG/DL (ref 8.7–10.5)
CHLORIDE SERPL-SCNC: 106 MMOL/L (ref 95–110)
CHOLEST SERPL-MCNC: 180 MG/DL (ref 120–199)
CHOLEST/HDLC SERPL: 2.6 {RATIO} (ref 2–5)
CO2 SERPL-SCNC: 29 MMOL/L (ref 23–29)
CREAT SERPL-MCNC: 1.1 MG/DL (ref 0.5–1.4)
EST. GFR  (NO RACE VARIABLE): 51.8 ML/MIN/1.73 M^2
ESTIMATED AVG GLUCOSE: 105 MG/DL (ref 68–131)
GLUCOSE SERPL-MCNC: 88 MG/DL (ref 70–110)
HBA1C MFR BLD: 5.3 % (ref 4–5.6)
HDLC SERPL-MCNC: 69 MG/DL (ref 40–75)
HDLC SERPL: 38.3 % (ref 20–50)
LDLC SERPL CALC-MCNC: 103.6 MG/DL (ref 63–159)
NONHDLC SERPL-MCNC: 111 MG/DL
POTASSIUM SERPL-SCNC: 3.8 MMOL/L (ref 3.5–5.1)
SODIUM SERPL-SCNC: 143 MMOL/L (ref 136–145)
TRIGL SERPL-MCNC: 37 MG/DL (ref 30–150)
TSH SERPL DL<=0.005 MIU/L-ACNC: 2.52 UIU/ML (ref 0.4–4)

## 2022-09-23 PROCEDURE — 84443 ASSAY THYROID STIM HORMONE: CPT | Mod: PO | Performed by: INTERNAL MEDICINE

## 2022-09-23 PROCEDURE — 80048 BASIC METABOLIC PNL TOTAL CA: CPT | Mod: PO | Performed by: INTERNAL MEDICINE

## 2022-09-23 PROCEDURE — 36415 COLL VENOUS BLD VENIPUNCTURE: CPT | Mod: PO | Performed by: INTERNAL MEDICINE

## 2022-09-23 PROCEDURE — 80061 LIPID PANEL: CPT | Performed by: INTERNAL MEDICINE

## 2022-09-23 PROCEDURE — 83036 HEMOGLOBIN GLYCOSYLATED A1C: CPT | Performed by: INTERNAL MEDICINE

## 2022-09-24 ENCOUNTER — HOSPITAL ENCOUNTER (EMERGENCY)
Facility: HOSPITAL | Age: 77
Discharge: HOME | End: 2022-09-24
Attending: EMERGENCY MEDICINE
Payer: MEDICARE

## 2022-09-24 VITALS
RESPIRATION RATE: 19 BRPM | WEIGHT: 167 LBS | HEART RATE: 64 BPM | BODY MASS INDEX: 28.67 KG/M2 | OXYGEN SATURATION: 99 % | DIASTOLIC BLOOD PRESSURE: 88 MMHG | SYSTOLIC BLOOD PRESSURE: 189 MMHG | TEMPERATURE: 99 F

## 2022-09-24 DIAGNOSIS — R40.0 SOMNOLENCE: ICD-10-CM

## 2022-09-24 DIAGNOSIS — T42.4X5A: Primary | ICD-10-CM

## 2022-09-24 LAB
ALBUMIN SERPL BCP-MCNC: 3.7 G/DL (ref 3.5–5.2)
ALP SERPL-CCNC: 45 U/L (ref 55–135)
ALT SERPL W/O P-5'-P-CCNC: 22 U/L (ref 10–44)
ANION GAP SERPL CALC-SCNC: 10 MMOL/L (ref 8–16)
AST SERPL-CCNC: 19 U/L (ref 10–40)
BASOPHILS # BLD AUTO: 0.07 K/UL (ref 0–0.2)
BASOPHILS NFR BLD: 1.4 % (ref 0–1.9)
BILIRUB SERPL-MCNC: 0.3 MG/DL (ref 0.1–1)
BUN SERPL-MCNC: 26 MG/DL (ref 8–23)
CALCIUM SERPL-MCNC: 9.4 MG/DL (ref 8.7–10.5)
CHLORIDE SERPL-SCNC: 106 MMOL/L (ref 95–110)
CO2 SERPL-SCNC: 27 MMOL/L (ref 23–29)
CREAT SERPL-MCNC: 1.2 MG/DL (ref 0.5–1.4)
DIFFERENTIAL METHOD: ABNORMAL
EOSINOPHIL # BLD AUTO: 0.1 K/UL (ref 0–0.5)
EOSINOPHIL NFR BLD: 1.6 % (ref 0–8)
ERYTHROCYTE [DISTWIDTH] IN BLOOD BY AUTOMATED COUNT: 16.6 % (ref 11.5–14.5)
EST. GFR  (NO RACE VARIABLE): 46.6 ML/MIN/1.73 M^2
GLUCOSE SERPL-MCNC: 74 MG/DL (ref 70–110)
HCT VFR BLD AUTO: 31.7 % (ref 37–48.5)
HGB BLD-MCNC: 10 G/DL (ref 12–16)
IMM GRANULOCYTES # BLD AUTO: 0 K/UL (ref 0–0.04)
IMM GRANULOCYTES NFR BLD AUTO: 0 % (ref 0–0.5)
LYMPHOCYTES # BLD AUTO: 1.4 K/UL (ref 1–4.8)
LYMPHOCYTES NFR BLD: 28.5 % (ref 18–48)
MCH RBC QN AUTO: 27.6 PG (ref 27–31)
MCHC RBC AUTO-ENTMCNC: 31.5 G/DL (ref 32–36)
MCV RBC AUTO: 88 FL (ref 82–98)
MONOCYTES # BLD AUTO: 0.9 K/UL (ref 0.3–1)
MONOCYTES NFR BLD: 17.8 % (ref 4–15)
NEUTROPHILS # BLD AUTO: 2.5 K/UL (ref 1.8–7.7)
NEUTROPHILS NFR BLD: 50.7 % (ref 38–73)
NRBC BLD-RTO: 0 /100 WBC
PLATELET # BLD AUTO: 198 K/UL (ref 150–450)
PMV BLD AUTO: 10.2 FL (ref 9.2–12.9)
POCT GLUCOSE: 76 MG/DL (ref 70–110)
POTASSIUM SERPL-SCNC: 3.5 MMOL/L (ref 3.5–5.1)
PROT SERPL-MCNC: 7.4 G/DL (ref 6–8.4)
RBC # BLD AUTO: 3.62 M/UL (ref 4–5.4)
SODIUM SERPL-SCNC: 143 MMOL/L (ref 136–145)
TROPONIN I SERPL DL<=0.01 NG/ML-MCNC: 0.01 NG/ML (ref 0–0.03)
TSH SERPL DL<=0.005 MIU/L-ACNC: 1.37 UIU/ML (ref 0.4–4)
WBC # BLD AUTO: 5.01 K/UL (ref 3.9–12.7)

## 2022-09-24 PROCEDURE — 80053 COMPREHEN METABOLIC PANEL: CPT | Mod: ER | Performed by: EMERGENCY MEDICINE

## 2022-09-24 PROCEDURE — 84443 ASSAY THYROID STIM HORMONE: CPT | Mod: ER | Performed by: EMERGENCY MEDICINE

## 2022-09-24 PROCEDURE — 99284 EMERGENCY DEPT VISIT MOD MDM: CPT | Mod: 25,ER

## 2022-09-24 PROCEDURE — 82962 GLUCOSE BLOOD TEST: CPT | Mod: ER

## 2022-09-24 PROCEDURE — 93010 EKG 12-LEAD: ICD-10-PCS | Mod: ,,, | Performed by: INTERNAL MEDICINE

## 2022-09-24 PROCEDURE — 84484 ASSAY OF TROPONIN QUANT: CPT | Mod: ER | Performed by: EMERGENCY MEDICINE

## 2022-09-24 PROCEDURE — 85025 COMPLETE CBC W/AUTO DIFF WBC: CPT | Mod: ER | Performed by: EMERGENCY MEDICINE

## 2022-09-24 PROCEDURE — 93010 ELECTROCARDIOGRAM REPORT: CPT | Mod: ,,, | Performed by: INTERNAL MEDICINE

## 2022-09-24 PROCEDURE — 25000003 PHARM REV CODE 250: Mod: ER | Performed by: EMERGENCY MEDICINE

## 2022-09-24 RX ORDER — LORAZEPAM 0.5 MG/1
TABLET ORAL
COMMUNITY
End: 2022-09-24 | Stop reason: SDUPTHER

## 2022-09-24 RX ORDER — LORAZEPAM 0.5 MG/1
0.25 TABLET ORAL NIGHTLY PRN
Qty: 15 TABLET | Refills: 0 | Status: SHIPPED | OUTPATIENT
Start: 2022-09-24 | End: 2022-10-24

## 2022-09-24 RX ORDER — ESTRADIOL 0.1 MG/G
CREAM VAGINAL
COMMUNITY

## 2022-09-24 RX ORDER — HYDRALAZINE HYDROCHLORIDE 25 MG/1
50 TABLET, FILM COATED ORAL EVERY 12 HOURS
COMMUNITY

## 2022-09-24 RX ORDER — HYDROCHLOROTHIAZIDE 25 MG/1
25 TABLET ORAL
Status: COMPLETED | OUTPATIENT
Start: 2022-09-24 | End: 2022-09-24

## 2022-09-24 RX ADMIN — HYDROCHLOROTHIAZIDE 25 MG: 25 TABLET ORAL at 05:09

## 2022-09-24 NOTE — ED PROVIDER NOTES
Encounter Date: 9/24/2022       History     Chief Complaint   Patient presents with    Altered Mental Status     Family noticed pt lethargic today. Pt seemed disoriented earlier today per daughter. Pt oriented to self, month, and name. Does not know what is going on. Pt admits she may have taken newly prescribed sleeping pill this morning.      Janet Hamilton is a 76 y.o. female who presents with 1 day of gradual onset, constant, worsening severe fatigue/somnolence at home with slurred speech after possibly taking her new lorazepam sleep/anxiety medication (0.5 mg) in the morning by accident.  She has no other complaints.  She has had no prior episodes.             Review of patient's allergies indicates:  No Known Allergies  Past Medical History:   Diagnosis Date    Anemia     Anxiety     Bronchitis     Enlarged heart     Hyperlipidemia     Hypertension      Past Surgical History:   Procedure Laterality Date    TUBAL LIGATION      VAGINAL PROLAPSE REPAIR  09/10/2013    Uterus prolapse/ [facility]     Family History   Problem Relation Age of Onset    Stroke Brother      Social History     Tobacco Use    Smoking status: Never    Smokeless tobacco: Never   Substance Use Topics    Alcohol use: Yes     Comment: occasional     Drug use: No     Review of Systems   Constitutional: Negative.  Negative for fever.   HENT: Negative.     Eyes: Negative.    Respiratory: Negative.  Negative for shortness of breath.    Cardiovascular: Negative.  Negative for chest pain.   Gastrointestinal: Negative.    Endocrine: Negative.    Genitourinary: Negative.    Musculoskeletal: Negative.    Skin: Negative.    Allergic/Immunologic: Negative.    Neurological: Negative.    Hematological: Negative.    Psychiatric/Behavioral: Negative.     All other systems reviewed and are negative.    Physical Exam     Initial Vitals   BP Pulse Resp Temp SpO2   09/24/22 1604 09/24/22 1604 09/24/22 1604 09/24/22 1618 09/24/22 1604   (!) 168/80 68 18 98.5  °F (36.9 °C) 98 %      MAP       --                Physical Exam    Nursing note and vitals reviewed.  Constitutional: She appears well-developed and well-nourished. No distress.   HENT:   Head: Normocephalic and atraumatic.   Bidirectional horizontal nystagmus   Eyes: Conjunctivae and EOM are normal. Pupils are equal, round, and reactive to light.   Neck: Neck supple.   Cardiovascular:  Normal rate, regular rhythm, normal heart sounds and intact distal pulses.           Pulmonary/Chest: Breath sounds normal. No respiratory distress.   Decreased RR   Abdominal: Abdomen is soft. She exhibits no distension. There is no abdominal tenderness.   Musculoskeletal:         General: Normal range of motion.      Cervical back: Neck supple.     Neurological: She is oriented to person, place, and time. She has normal strength.   Somnolence but oriented x3 and easily arousable, pleasant. Long latency of speech and reaction, mildly slurred speech   Skin: Skin is warm and dry. Capillary refill takes less than 2 seconds.   Psychiatric: She has a normal mood and affect.       ED Course   Procedures  Labs Reviewed   CBC W/ AUTO DIFFERENTIAL - Abnormal; Notable for the following components:       Result Value    RBC 3.62 (*)     Hemoglobin 10.0 (*)     Hematocrit 31.7 (*)     MCHC 31.5 (*)     RDW 16.6 (*)     Mono % 17.8 (*)     All other components within normal limits   COMPREHENSIVE METABOLIC PANEL - Abnormal; Notable for the following components:    BUN 26 (*)     Alkaline Phosphatase 45 (*)     eGFR 46.6 (*)     All other components within normal limits   TROPONIN I   TSH   POCT GLUCOSE     EKG Interpretation by Emergency Physician: Waqas Lebron MD  Rhythm: sinus bradycardia   Rate: 57 bpm  Nonspecific ST changes. No STEMI.   Incomplete RBBB.       Imaging Results    None          Medications   hydroCHLOROthiazide tablet 25 mg (25 mg Oral Given 9/24/22 7790)                  No results found for this or any previous visit  (from the past 24 hour(s)).    Patient's evaluation in the ED does not suggest any emergent or life threatening medical conditions requiring immediate intervention beyond what was provided in the ED, and I believe patient is safe for discharge.  Regardless, an unremarkable evaluation in the ED does not preclude the development or presence of a serious or life threatening condition. As such, patient was given return instructions for any change or worsening in symptoms.              Clinical Impression:   Final diagnoses:  [R40.0] Somnolence  [T42.4X5A] Benzodiazepine causing adverse effect in therapeutic use, initial encounter (Primary)        ED Disposition Condition    Discharge Stable          ED Prescriptions       Medication Sig Dispense Start Date End Date Auth. Provider    LORazepam (ATIVAN) 0.5 MG tablet Take 0.5 tablets (0.25 mg total) by mouth nightly as needed (insomnia/anxiety). 15 tablet 9/24/2022 10/24/2022 Waqas Lebron MD          Follow-up Information       Follow up With Specialties Details Why Contact Info    Omid Morris MD Pathology Schedule an appointment as soon as possible for a visit   [address] [place] [id number]  [phone]      [facility] - Emergency Dept Emergency Medicine  As needed, If symptoms worsen [id number] [facility] [address]  [facility] [id number] [phone]             Waqas Lebron MD  09/25/22 2674

## 2023-08-28 ENCOUNTER — TELEPHONE (OUTPATIENT)
Dept: PODIATRY | Facility: CLINIC | Age: 78
End: 2023-08-28
Payer: MEDICARE

## 2023-09-15 ENCOUNTER — APPOINTMENT (OUTPATIENT)
Dept: RADIOLOGY | Facility: HOSPITAL | Age: 78
End: 2023-09-15
Attending: INTERNAL MEDICINE
Payer: MEDICARE

## 2023-09-15 DIAGNOSIS — Z78.0 ASYMPTOMATIC MENOPAUSAL STATE: ICD-10-CM

## 2023-09-15 PROCEDURE — 77080 DXA BONE DENSITY AXIAL SKELETON 1 OR MORE SITES: ICD-10-PCS | Mod: 26,,, | Performed by: RADIOLOGY

## 2023-09-15 PROCEDURE — 77080 DXA BONE DENSITY AXIAL: CPT | Mod: 26,,, | Performed by: RADIOLOGY

## 2023-09-15 PROCEDURE — 77080 DXA BONE DENSITY AXIAL: CPT | Mod: TC

## 2023-10-09 ENCOUNTER — OFFICE VISIT (OUTPATIENT)
Dept: PODIATRY | Facility: CLINIC | Age: 78
End: 2023-10-09
Payer: MEDICARE

## 2023-10-09 VITALS — HEIGHT: 64 IN | BODY MASS INDEX: 28.53 KG/M2 | WEIGHT: 167.13 LBS

## 2023-10-09 DIAGNOSIS — M20.12 VALGUS DEFORMITY OF BOTH GREAT TOES: ICD-10-CM

## 2023-10-09 DIAGNOSIS — M20.11 VALGUS DEFORMITY OF BOTH GREAT TOES: ICD-10-CM

## 2023-10-09 DIAGNOSIS — B35.1 DERMATOPHYTOSIS OF NAIL: Primary | ICD-10-CM

## 2023-10-09 PROCEDURE — 1159F MED LIST DOCD IN RCRD: CPT | Mod: CPTII,S$GLB,, | Performed by: PODIATRIST

## 2023-10-09 PROCEDURE — 1160F PR REVIEW ALL MEDS BY PRESCRIBER/CLIN PHARMACIST DOCUMENTED: ICD-10-PCS | Mod: CPTII,S$GLB,, | Performed by: PODIATRIST

## 2023-10-09 PROCEDURE — 1159F PR MEDICATION LIST DOCUMENTED IN MEDICAL RECORD: ICD-10-PCS | Mod: CPTII,S$GLB,, | Performed by: PODIATRIST

## 2023-10-09 PROCEDURE — 99999 PR PBB SHADOW E&M-EST. PATIENT-LVL III: ICD-10-PCS | Mod: PBBFAC,,, | Performed by: PODIATRIST

## 2023-10-09 PROCEDURE — 1101F PR PT FALLS ASSESS DOC 0-1 FALLS W/OUT INJ PAST YR: ICD-10-PCS | Mod: CPTII,S$GLB,, | Performed by: PODIATRIST

## 2023-10-09 PROCEDURE — 1125F AMNT PAIN NOTED PAIN PRSNT: CPT | Mod: CPTII,S$GLB,, | Performed by: PODIATRIST

## 2023-10-09 PROCEDURE — 3288F FALL RISK ASSESSMENT DOCD: CPT | Mod: CPTII,S$GLB,, | Performed by: PODIATRIST

## 2023-10-09 PROCEDURE — 1101F PT FALLS ASSESS-DOCD LE1/YR: CPT | Mod: CPTII,S$GLB,, | Performed by: PODIATRIST

## 2023-10-09 PROCEDURE — 99213 PR OFFICE/OUTPT VISIT, EST, LEVL III, 20-29 MIN: ICD-10-PCS | Mod: S$GLB,,, | Performed by: PODIATRIST

## 2023-10-09 PROCEDURE — 99999 PR PBB SHADOW E&M-EST. PATIENT-LVL III: CPT | Mod: PBBFAC,,, | Performed by: PODIATRIST

## 2023-10-09 PROCEDURE — 1160F RVW MEDS BY RX/DR IN RCRD: CPT | Mod: CPTII,S$GLB,, | Performed by: PODIATRIST

## 2023-10-09 PROCEDURE — 1125F PR PAIN SEVERITY QUANTIFIED, PAIN PRESENT: ICD-10-PCS | Mod: CPTII,S$GLB,, | Performed by: PODIATRIST

## 2023-10-09 PROCEDURE — 99213 OFFICE O/P EST LOW 20 MIN: CPT | Mod: S$GLB,,, | Performed by: PODIATRIST

## 2023-10-09 PROCEDURE — 3288F PR FALLS RISK ASSESSMENT DOCUMENTED: ICD-10-PCS | Mod: CPTII,S$GLB,, | Performed by: PODIATRIST

## 2023-10-09 NOTE — PROGRESS NOTES
Subjective:     Patient ID: Janet Hamilton is a 77 y.o. female.    Chief Complaint: Nail Care (Nail care, trim nails and third toenail started bleeding. Rates pain 6/10. Wearing tennis and socks, (non-diabetic last seen PCP Dr. Morris 08/24/23))    [place] is a 77 y.o. female who presents to the clinic complaining of thick and discolored toenails on both feet. [place] states she cute the left third toenail and it started bleeding. Patient states she also have pain in all shoes with her toes, not the bunions but her toes. Patient has no other pedal complaints at this time.     Patient Active Problem List   Diagnosis    Essential hypertension, benign    Bilateral bunions    Hyperlipemia    Overactive bladder       Medication List with Changes/Refills   Current Medications    ESTRADIOL (ESTRACE) 0.01 % (0.1 MG/GRAM) VAGINAL CREAM    estradiol 0.01% (0.1 mg/gram) vaginal cream    HYDRALAZINE (APRESOLINE) 25 MG TABLET    hydralazine 25 mg tablet   Take 1 tablet twice a day by oral route.    LORAZEPAM (ATIVAN) 0.5 MG TABLET    Take 0.5 tablets (0.25 mg total) by mouth nightly as needed (insomnia/anxiety).    LOSARTAN-HYDROCHLOROTHIAZIDE 100-25 MG (HYZAAR) 100-25 MG PER TABLET    Take 1 tablet by mouth once daily.    METOPROLOL TARTRATE (LOPRESSOR) 100 MG TABLET    Take 1 tablet (100 mg total) by mouth once daily.    VERAPAMIL (CALAN-SR) 240 MG CR TABLET    Take 1 tablet (240 mg total) by mouth once daily.       Review of patient's allergies indicates:  No Known Allergies    Past Surgical History:   Procedure Laterality Date    TUBAL LIGATION      VAGINAL PROLAPSE REPAIR  09/10/2013    Uterus prolapse/ [facility]       Family History   Problem Relation Age of Onset    Stroke Brother        Social History     Socioeconomic History    Marital status:      Spouse name: Solomon    Number of children: 3   Occupational History    Occupation: Retired   Tobacco Use    Smoking status: Never    Smokeless tobacco: Never  "  Substance and Sexual Activity    Alcohol use: Yes     Comment: occasional     Drug use: No    Sexual activity: Not Currently       Vitals:    10/09/23 1529   Weight: 75.8 kg (167 lb 1.7 oz)   Height: 5' 4" (1.626 m)   PainSc:   6         Hemoglobin A1C   Date Value Ref Range Status   09/23/2022 5.3 4.0 - 5.6 % Final     Comment:     ADA Screening Guidelines:  5.7-6.4%  Consistent with prediabetes  >or=6.5%  Consistent with diabetes    High levels of fetal hemoglobin interfere with the HbA1C  assay. Heterozygous hemoglobin variants (HbS, HgC, etc)do  not significantly interfere with this assay.   However, presence of multiple variants may affect accuracy.     03/08/2022 5.4 4.0 - 5.6 % Final     Comment:     ADA Screening Guidelines:  5.7-6.4%  Consistent with prediabetes  >or=6.5%  Consistent with diabetes    High levels of fetal hemoglobin interfere with the HbA1C  assay. Heterozygous hemoglobin variants (HbS, HgC, etc)do  not significantly interfere with this assay.   However, presence of multiple variants may affect accuracy.         Review of Systems   Constitutional:  Negative for chills and fever.   Respiratory:  Negative for shortness of breath.    Cardiovascular:  Negative for chest pain, palpitations, orthopnea, claudication and leg swelling.   Gastrointestinal:  Negative for diarrhea, nausea and vomiting.   Musculoskeletal:  Negative for joint pain.   Skin:  Negative for rash.   Neurological:  Negative for dizziness, tingling, sensory change, focal weakness and weakness.   Psychiatric/Behavioral: Negative.           Objective:      PHYSICAL EXAM: Apperance: Alert and orient in no distress,well developed, and with good attention to grooming and body habits  LOWER EXTREMITY EXAM:  VASCULAR: Dorsalis pedis pulses 2/4 bilateral and Posterior Tibial pulses 2/4 bilateral. Capillary fill time <4 seconds bilateral. No edema observed bilateral. Varicosities absent bilateral. Skin temperature of the lower " extremities is warm to warm, proximal to distal. Hair growth WNL bilateral.  DERMATOLOGICAL: No skin rashes, subcutaneous nodules, lesions, or ulcers observed bilateral. Nails 1,2,3,4,5 bilateral thickened, and discolored with subungual debris. Webspaces 1,2,3,4 bilateral clean, dry and without evidence of break in skin integrity.  NEUROLOGICAL: Light touch, sharp-dull, proprioception all present and equal bilaterally.     MUSCULOSKELETAL: Muscle strength is 5/5 for foot inverters, everters, plantarflexors, and dorsiflexors. Muscle tone is normal. Negative pain on palpation of nails bilateral. Moderate bunions noted bilateral.         Assessment:       ICD-10-CM ICD-9-CM   1. Dermatophytosis of nail  B35.1 110.1   2. Valgus deformity of both great toes  M20.11 735.0    M20.12          Plan:   Dermatophytosis of nail    Valgus deformity of both great toes    I counseled the patient on her conditions, regarding findings of my examination, my impressions, and usual treatment plan.   Conservatively we did discuss proper nail cutting for thickened toenails. The patient elects for conservative management at this time.   Discussed surgical and conservative management of bunion deformity. Conservatively we did discuss padding, and shoe modifications such as softer shoes with wide toe boxes. Surgically we briefly discussed pre and post operative expectations. The patient elects for conservative management at this time   The patient and I reviewed the types of shoes she should be wearing, my recommendation includes generally the best time of the day for a shoe fitting is the afternoon, shoes with a wide toe box, very good cushion, and tennis shoes with removable inner soles.The patient and I reviewed my recommendations for over-the-counter orthotic inserts.   Patient to return as needed.     PROC B discussed with patient.          Vance Davis DPM  [facility]

## 2023-11-01 ENCOUNTER — HOSPITAL ENCOUNTER (EMERGENCY)
Facility: HOSPITAL | Age: 78
Discharge: HOME | End: 2023-11-01
Attending: EMERGENCY MEDICINE
Payer: MEDICARE

## 2023-11-01 VITALS
SYSTOLIC BLOOD PRESSURE: 167 MMHG | OXYGEN SATURATION: 100 % | HEART RATE: 70 BPM | HEIGHT: 64 IN | TEMPERATURE: 98 F | WEIGHT: 143.5 LBS | RESPIRATION RATE: 22 BRPM | DIASTOLIC BLOOD PRESSURE: 79 MMHG | BODY MASS INDEX: 24.5 KG/M2

## 2023-11-01 DIAGNOSIS — I10 HYPERTENSION: ICD-10-CM

## 2023-11-01 DIAGNOSIS — R03.0 ELEVATED BLOOD PRESSURE READING: Primary | ICD-10-CM

## 2023-11-01 DIAGNOSIS — D64.9 ANEMIA, UNSPECIFIED TYPE: ICD-10-CM

## 2023-11-01 LAB
ALBUMIN SERPL BCP-MCNC: 4 G/DL (ref 3.5–5.2)
ALP SERPL-CCNC: 49 U/L (ref 55–135)
ALT SERPL W/O P-5'-P-CCNC: 18 U/L (ref 10–44)
ANION GAP SERPL CALC-SCNC: 12 MMOL/L (ref 8–16)
AST SERPL-CCNC: 23 U/L (ref 10–40)
BASOPHILS # BLD AUTO: 0.07 K/UL (ref 0–0.2)
BASOPHILS NFR BLD: 1.4 % (ref 0–1.9)
BILIRUB SERPL-MCNC: 0.5 MG/DL (ref 0.1–1)
BILIRUB UR QL STRIP: NEGATIVE
BNP SERPL-MCNC: 435 PG/ML (ref 0–99)
BUN SERPL-MCNC: 20 MG/DL (ref 8–23)
CALCIUM SERPL-MCNC: 9.6 MG/DL (ref 8.7–10.5)
CHLORIDE SERPL-SCNC: 104 MMOL/L (ref 95–110)
CLARITY UR REFRACT.AUTO: CLEAR
CO2 SERPL-SCNC: 26 MMOL/L (ref 23–29)
COLOR UR AUTO: YELLOW
CREAT SERPL-MCNC: 0.9 MG/DL (ref 0.5–1.4)
DIFFERENTIAL METHOD: ABNORMAL
EOSINOPHIL # BLD AUTO: 0.1 K/UL (ref 0–0.5)
EOSINOPHIL NFR BLD: 1.6 % (ref 0–8)
ERYTHROCYTE [DISTWIDTH] IN BLOOD BY AUTOMATED COUNT: 16.4 % (ref 11.5–14.5)
EST. GFR  (NO RACE VARIABLE): >60 ML/MIN/1.73 M^2
GLUCOSE SERPL-MCNC: 88 MG/DL (ref 70–110)
GLUCOSE UR QL STRIP: NEGATIVE
HCT VFR BLD AUTO: 31.6 % (ref 37–48.5)
HGB BLD-MCNC: 10.3 G/DL (ref 12–16)
HGB UR QL STRIP: NEGATIVE
IMM GRANULOCYTES # BLD AUTO: 0.02 K/UL (ref 0–0.04)
IMM GRANULOCYTES NFR BLD AUTO: 0.4 % (ref 0–0.5)
KETONES UR QL STRIP: NEGATIVE
LEUKOCYTE ESTERASE UR QL STRIP: NEGATIVE
LYMPHOCYTES # BLD AUTO: 1.4 K/UL (ref 1–4.8)
LYMPHOCYTES NFR BLD: 28.2 % (ref 18–48)
MCH RBC QN AUTO: 28.9 PG (ref 27–31)
MCHC RBC AUTO-ENTMCNC: 32.6 G/DL (ref 32–36)
MCV RBC AUTO: 89 FL (ref 82–98)
MONOCYTES # BLD AUTO: 0.8 K/UL (ref 0.3–1)
MONOCYTES NFR BLD: 14.9 % (ref 4–15)
NEUTROPHILS # BLD AUTO: 2.7 K/UL (ref 1.8–7.7)
NEUTROPHILS NFR BLD: 53.5 % (ref 38–73)
NITRITE UR QL STRIP: NEGATIVE
NRBC BLD-RTO: 0 /100 WBC
PH UR STRIP: 7 [PH] (ref 5–8)
PLATELET # BLD AUTO: 204 K/UL (ref 150–450)
PMV BLD AUTO: 10.3 FL (ref 9.2–12.9)
POTASSIUM SERPL-SCNC: 3.6 MMOL/L (ref 3.5–5.1)
PROT SERPL-MCNC: 7.7 G/DL (ref 6–8.4)
PROT UR QL STRIP: NEGATIVE
RBC # BLD AUTO: 3.57 M/UL (ref 4–5.4)
SODIUM SERPL-SCNC: 142 MMOL/L (ref 136–145)
SP GR UR STRIP: 1.01 (ref 1–1.03)
URN SPEC COLLECT METH UR: NORMAL
UROBILINOGEN UR STRIP-ACNC: NEGATIVE EU/DL
WBC # BLD AUTO: 5.1 K/UL (ref 3.9–12.7)

## 2023-11-01 PROCEDURE — 63600175 PHARM REV CODE 636 W HCPCS: Mod: ER | Performed by: EMERGENCY MEDICINE

## 2023-11-01 PROCEDURE — 93010 EKG 12-LEAD: ICD-10-PCS | Mod: ,,, | Performed by: INTERNAL MEDICINE

## 2023-11-01 PROCEDURE — 80053 COMPREHEN METABOLIC PANEL: CPT | Mod: ER | Performed by: EMERGENCY MEDICINE

## 2023-11-01 PROCEDURE — 83880 ASSAY OF NATRIURETIC PEPTIDE: CPT | Mod: ER | Performed by: EMERGENCY MEDICINE

## 2023-11-01 PROCEDURE — 25000003 PHARM REV CODE 250: Mod: ER | Performed by: EMERGENCY MEDICINE

## 2023-11-01 PROCEDURE — 81003 URINALYSIS AUTO W/O SCOPE: CPT | Mod: ER | Performed by: EMERGENCY MEDICINE

## 2023-11-01 PROCEDURE — 96374 THER/PROPH/DIAG INJ IV PUSH: CPT | Mod: ER

## 2023-11-01 PROCEDURE — 93005 ELECTROCARDIOGRAM TRACING: CPT | Mod: ER

## 2023-11-01 PROCEDURE — 93010 ELECTROCARDIOGRAM REPORT: CPT | Mod: ,,, | Performed by: INTERNAL MEDICINE

## 2023-11-01 PROCEDURE — 99285 EMERGENCY DEPT VISIT HI MDM: CPT | Mod: 25,ER

## 2023-11-01 PROCEDURE — 85025 COMPLETE CBC W/AUTO DIFF WBC: CPT | Mod: ER | Performed by: EMERGENCY MEDICINE

## 2023-11-01 RX ORDER — NITROFURANTOIN 25; 75 MG/1; MG/1
100 CAPSULE ORAL
COMMUNITY
Start: 2023-10-17

## 2023-11-01 RX ORDER — HYDRALAZINE HYDROCHLORIDE 20 MG/ML
10 INJECTION INTRAMUSCULAR; INTRAVENOUS
Status: COMPLETED | OUTPATIENT
Start: 2023-11-01 | End: 2023-11-01

## 2023-11-01 RX ORDER — HYDRALAZINE HYDROCHLORIDE 20 MG/ML
10 INJECTION INTRAMUSCULAR; INTRAVENOUS
Status: DISCONTINUED | OUTPATIENT
Start: 2023-11-01 | End: 2023-11-01

## 2023-11-01 RX ORDER — AMOXICILLIN 500 MG/1
TABLET, FILM COATED ORAL
COMMUNITY
Start: 2023-10-27

## 2023-11-01 RX ORDER — LOSARTAN POTASSIUM 25 MG/1
100 TABLET ORAL
Status: COMPLETED | OUTPATIENT
Start: 2023-11-01 | End: 2023-11-01

## 2023-11-01 RX ORDER — HYDROCHLOROTHIAZIDE 25 MG/1
25 TABLET ORAL
Status: COMPLETED | OUTPATIENT
Start: 2023-11-01 | End: 2023-11-01

## 2023-11-01 RX ADMIN — HYDROCHLOROTHIAZIDE 25 MG: 25 TABLET ORAL at 12:11

## 2023-11-01 RX ADMIN — LOSARTAN POTASSIUM 100 MG: 25 TABLET, FILM COATED ORAL at 12:11

## 2023-11-01 RX ADMIN — HYDRALAZINE HYDROCHLORIDE 10 MG: 20 INJECTION, SOLUTION INTRAMUSCULAR; INTRAVENOUS at 10:11

## 2023-11-01 NOTE — ED PROVIDER NOTES
Emergency Medicine Provider Note - 11/1/2023       History     Chief Complaint   Patient presents with    Hypertension     High blood pressure at home since fri over 200.denies any problems or complaints       Allergies:  Review of patient's allergies indicates:  No Known Allergies     History of Present Illness   HPI    11/1/2023, 10:08 AM  The history is provided by the patient and sister Sherry.    Janet Hamilton is a 77 y.o. female presenting to the ED for elevated blood pressure readings.  Patient had been to the dentist on Friday for routine cleaning.  She was started on amoxicillin for possible early abscess.  The dentist noted that her blood pressure was elevated.    She reports that the blood pressure has been over 200 over weekend.  She reports that she is been compliant with her losartan/hydrochlorothiazide, metoprolol, verapamil, as well as hydralazine.  Help she is not taken the medication today.  Other than the elevated blood pressure reading, patient states that she feels fine.  Patient denies any headache, visual changes, difficulty talking, shortness of breath, orthopnea, chest pain, chest pressure, abdominal pain, numbness or weakness to 1 side, balance issues.      Arrival mode: Private Vehicle     PCP: Omid Morris MD     Past Medical History:  Past Medical History:   Diagnosis Date    Anemia     Anxiety     Bronchitis     Enlarged heart     Hyperlipidemia     Hypertension        Past Surgical History:  Past Surgical History:   Procedure Laterality Date    TUBAL LIGATION      VAGINAL PROLAPSE REPAIR  09/10/2013    Uterus prolapse/ [facility]         Family History:  Family History   Problem Relation Age of Onset    Stroke Brother        Social History:  Social History     Tobacco Use    Smoking status: Never    Smokeless tobacco: Never   Substance and Sexual Activity    Alcohol use: Yes     Comment: occasional     Drug use: No    Sexual activity: Not Currently        Review of  Systems   Review of Systems   Constitutional:  Negative for fever.   HENT:  Negative for sore throat.    Respiratory:  Negative for shortness of breath.    Cardiovascular:  Negative for chest pain and leg swelling.   Gastrointestinal:  Negative for nausea and vomiting.   Genitourinary:  Negative for dysuria.   Musculoskeletal:  Negative for back pain.   Skin:  Negative for rash.   Neurological:  Negative for dizziness, speech difficulty, weakness, light-headedness, numbness and headaches.   Hematological:  Does not bruise/bleed easily.        Physical Exam     Initial Vitals [11/01/23 1002]   BP Pulse Resp Temp SpO2   (!) 234/105 (!) 57 20 98 °F (36.7 °C) 98 %      MAP       --          Physical Exam    Nursing Notes and Vital Signs Reviewed.  Constitutional: Patient is in . Well-developed and well-nourished.  Head: Atraumatic. Normocephalic.  Eyes: PERRL. EOM intact. Conjunctivae are not pale. No scleral icterus.  ENT: Mucous membranes are moist. Oropharynx is clear and symmetric.    Neck: Supple. Full ROM. No lymphadenopathy.  Cardiovascular: Regular rate. Regular rhythm. No murmurs, rubs, or gallops. Distal pulses are 2+ and symmetric.  Pulmonary/Chest: No respiratory distress. Clear to auscultation bilaterally. No wheezing or rales.  Abdominal: Soft and non-distended.  There is no tenderness.  No rebound, guarding, or rigidity. Good bowel sounds.  Genitourinary: No CVA tenderness  Musculoskeletal: Moves all extremities. No obvious deformities. No edema. No calf tenderness.  Skin: Warm and dry.  Neurological:  Alert, awake, and appropriate.  Normal speech.  No acute focal neurological deficits are appreciated.  Cranial nerves 2-12 intact.  GCS 15.  Negative pronator drift.    Psychiatric: Normal affect. Good eye contact. Appropriate in content.     ED Course   ED Procedures:  Critical Care    Date/Time: 11/1/2023 10:02 AM    Performed by: Kymberly Peck DO  Authorized by: Kymberly Peck DO  Direct  "patient critical care time: 20 minutes  Additional history critical care time: 2 minutes  Ordering / reviewing critical care time: 2 minutes  Documentation critical care time: 7 minutes  Total critical care time (exclusive of procedural time) : 31 minutes  Critical care time was exclusive of separately billable procedures and treating other patients.  Critical care was necessary to treat or prevent imminent or life-threatening deterioration of the following conditions: circulatory failure.  Critical care was time spent personally by me on the following activities: blood draw for specimens, development of treatment plan with patient or surrogate, discussions with consultants, discussions with primary provider, interpretation of cardiac output measurements, evaluation of patient's response to treatment, examination of patient, obtaining history from patient or surrogate, ordering and performing treatments and interventions, ordering and review of laboratory studies, ordering and review of radiographic studies, pulse oximetry, re-evaluation of patient's condition and review of old charts.          ED Vital Signs:  Vitals:    11/01/23 1002 11/01/23 1027 11/01/23 1032 11/01/23 1034   BP: (!) 234/105 (!) 225/105 (!) 220/102    Pulse: (!) 57 (!) 59 (!) 58 (!) 58   Resp: 20 17 16    Temp: 98 °F (36.7 °C)      TempSrc: Oral      SpO2: 98% 100% 100%    Weight: 65.1 kg (143 lb 8.3 oz)      Height: 5' 4" (1.626 m)       11/01/23 1048 11/01/23 1123 11/01/23 1145 11/01/23 1149   BP: (!) 212/96  (S) (!) 162/78 (!) 162/78   Pulse: (!) 57 67  73   Resp: 14 17  19   Temp:       TempSrc:       SpO2: 100% 98%  99%   Weight:       Height:        11/01/23 1202 11/01/23 1205 11/01/23 1206 11/01/23 1213   BP: (!) 164/81 (!) 164/86 (!) 164/86    Pulse: 71  72 75   Resp: 17  16 (!) 22   Temp:       TempSrc:       SpO2: 99%  100% 99%   Weight:       Height:        11/01/23 1243 11/01/23 1302 11/01/23 1343   BP: (!) 185/89 (!) 174/83 (!) " 167/79   Pulse: 73 73 70   Resp:      Temp:      TempSrc:      SpO2: 98% 100% 100%   Weight:      Height:          Abnormal Lab Results:  Labs Reviewed   CBC W/ AUTO DIFFERENTIAL - Abnormal; Notable for the following components:       Result Value    RBC 3.57 (*)     Hemoglobin 10.3 (*)     Hematocrit 31.6 (*)     RDW 16.4 (*)     All other components within normal limits   COMPREHENSIVE METABOLIC PANEL - Abnormal; Notable for the following components:    Alkaline Phosphatase 49 (*)     All other components within normal limits   B-TYPE NATRIURETIC PEPTIDE - Abnormal; Notable for the following components:     (*)     All other components within normal limits   URINALYSIS, REFLEX TO URINE CULTURE    Narrative:     Specimen Source->Urine        All Lab Results:  Results for orders placed or performed during the hospital encounter of 11/01/23   CBC auto differential   Result Value Ref Range    WBC 5.10 3.90 - 12.70 K/uL    RBC 3.57 (L) 4.00 - 5.40 M/uL    Hemoglobin 10.3 (L) 12.0 - 16.0 g/dL    Hematocrit 31.6 (L) 37.0 - 48.5 %    MCV 89 82 - 98 fL    MCH 28.9 27.0 - 31.0 pg    MCHC 32.6 32.0 - 36.0 g/dL    RDW 16.4 (H) 11.5 - 14.5 %    Platelets 204 150 - 450 K/uL    MPV 10.3 9.2 - 12.9 fL    Immature Granulocytes 0.4 0.0 - 0.5 %    Gran # (ANC) 2.7 1.8 - 7.7 K/uL    Immature Grans (Abs) 0.02 0.00 - 0.04 K/uL    Lymph # 1.4 1.0 - 4.8 K/uL    Mono # 0.8 0.3 - 1.0 K/uL    Eos # 0.1 0.0 - 0.5 K/uL    Baso # 0.07 0.00 - 0.20 K/uL    nRBC 0 0 /100 WBC    Gran % 53.5 38.0 - 73.0 %    Lymph % 28.2 18.0 - 48.0 %    Mono % 14.9 4.0 - 15.0 %    Eosinophil % 1.6 0.0 - 8.0 %    Basophil % 1.4 0.0 - 1.9 %    Differential Method Automated    Comprehensive metabolic panel   Result Value Ref Range    Sodium 142 136 - 145 mmol/L    Potassium 3.6 3.5 - 5.1 mmol/L    Chloride 104 95 - 110 mmol/L    CO2 26 23 - 29 mmol/L    Glucose 88 70 - 110 mg/dL    BUN 20 8 - 23 mg/dL    Creatinine 0.9 0.5 - 1.4 mg/dL    Calcium 9.6 8.7 - 10.5  mg/dL    Total Protein 7.7 6.0 - 8.4 g/dL    Albumin 4.0 3.5 - 5.2 g/dL    Total Bilirubin 0.5 0.1 - 1.0 mg/dL    Alkaline Phosphatase 49 (L) 55 - 135 U/L    AST 23 10 - 40 U/L    ALT 18 10 - 44 U/L    eGFR >60.0 >60 mL/min/1.73 m^2    Anion Gap 12 8 - 16 mmol/L   Brain natriuretic peptide   Result Value Ref Range     (H) 0 - 99 pg/mL   Urinalysis, Reflex to Urine Culture Urine, Clean Catch    Specimen: Urine   Result Value Ref Range    Specimen UA Urine, Clean Catch     Color, UA Yellow Yellow, Straw, [place]    Appearance, UA Clear Clear    pH, UA 7.0 5.0 - 8.0    Specific Gravity, UA 1.010 1.005 - 1.030    Protein, UA Negative Negative    Glucose, UA Negative Negative    Ketones, UA Negative Negative    Bilirubin (UA) Negative Negative    Occult Blood UA Negative Negative    Nitrite, UA Negative Negative    Urobilinogen, UA Negative <2.0 EU/dL    Leukocytes, UA Negative Negative         Reviewed Prior Labs:   Latest Reference Range & Units 08/21/18 13:57 09/24/22 16:55 11/01/23 10:50   Hemoglobin 12.0 - 16.0 g/dL 11.8 (L) 10.0 (L) 10.3 (L)   Hematocrit 37.0 - 48.5 % 36.3 (L) 31.7 (L) 31.6 (L)   (L): Data is abnormally low    The EKG was ordered, reviewed, and independently interpreted by the ED provider:      ECG Results              EKG 12-lead (Final result)  Result time 11/01/23 21:52:14      Final result by Interface, Lab In [facility] (11/01/23 21:52:14)                   Narrative:    Test Reason : I10,    Vent. Rate : 057 BPM     Atrial Rate : 057 BPM     P-R Int : 152 ms          QRS Dur : 114 ms      QT Int : 444 ms       P-R-T Axes : 051 -24 -05 degrees     QTc Int : 432 ms    Sinus bradycardia  Incomplete right bundle branch block  Moderate voltage criteria for LVH, may be normal variant ( R in aVL ,   [place] product )  Possible Anteroseptal infarct (cited on or before 21-AUG-2018)  When compared with ECG of 24-SEP-2022 17:02,  T wave inversion less evident in Inferior leads  Confirmed by NANNETTE   MD, NICOLE THAO ([id number]) on 11/1/2023 9:52:00 PM    Referred By: [email or web address]   SELF           Confirmed By:NICOLE BRUNSON MD                      Wet Read by Kymberly Peck DO (11/01/23 10:41:39, [facility], Emergency Medicine)    Rate of 57 beats per minute.  Sinus bradycardia.  Incomplete right bundle-branch block.  No ST segment elevation.  T-wave inversion noted in lead 3, nonspecific changes in the lateral leads.  This is compared to September 24, 2022 no acute changes                                    Imaging Results:  Imaging Results              CT Head Without Contrast (Final result)  Result time 11/01/23 11:25:56      Final result by Reid Márquez MD (11/01/23 11:25:56)                   Impression:      Chronic microvascular ischemic changes.      Electronically signed by: Reid Márquez MD  Date:    11/01/2023  Time:    11:25               Narrative:    EXAMINATION:  CT HEAD WITHOUT CONTRAST    CLINICAL HISTORY:  Syncope, recurrent; Essential (primary) hypertension    TECHNIQUE:  Low dose axial CT images obtained throughout the head without intravenous contrast. Sagittal and coronal reconstructions were performed.  All CT scans at this facility use dose modulation, iterative reconstruction and/or weight based dosing when appropriate to reduce radiation dose to as low as reasonably achievable.    COMPARISON:  06/26/2021    FINDINGS:  Intracranial compartment:    The brain parenchyma demonstrates areas of decreased attenuation with moderate periventricular white matter consistent with chronic microvascular ischemic changes..  No parenchymal mass, hemorrhage, edema or major vascular distribution infarct.  Vascular calcifications are noted.    Mild prominence of the sulci and ventricles are consistent with age-related involutional changes.    No extra-axial blood or fluid collections.    Skull/extracranial contents (limited evaluation): No fracture. Mastoid air cells and paranasal  sinuses are essentially clear.                                       X-Ray Chest AP Portable (Final result)  Result time 11/01/23 11:27:16      Final result by Reid Márquez MD (11/01/23 11:27:16)                   Impression:      No acute process seen.      Electronically signed by: Reid Márquez MD  Date:    11/01/2023  Time:    11:27               Narrative:    EXAMINATION:  XR CHEST AP PORTABLE    CLINICAL HISTORY:  Essential (primary) hypertension    FINDINGS:  Single view of the chest.  Comparison 07/17/2017    Cardiac silhouette is normal.  The lungs demonstrate no evidence of active disease.  Mild atelectasis left lung base.  No evidence of pleural effusion or pneumothorax.  Bones appear intact.  Moderate degenerative changes and moderate atherosclerotic disease.                                       Type of Interpretation: ED Physician (Independently Interpreted).  Radiology Procedure Done: Portable CXR.  Interpretation: No acute abnormality.          The Emergency Provider reviewed the vital signs and test results, which are outlined above.     ED Discussion   ED Medication(s):  Medications   hydrALAZINE injection 10 mg (10 mg Intravenous Given 11/1/23 1056)   losartan tablet 100 mg (100 mg Oral Given 11/1/23 1205)   hydroCHLOROthiazide tablet 25 mg (25 mg Oral Given 11/1/23 1205)       ED Course as of 11/02/23 1222   Wed Nov 01, 2023   1316 Updated patient on results of tests.  Patient still remains asymptomatic. [LB]      ED Course User Index  [LB] Kymberly Peck, DO        1316 Reassessment: Dr. Peck reassessed the pt.  The pt is resting comfortably and is NAD.  Pt states their sx have improved. Discussed test results, shared treatment plan, specific conditions for return, and the need for f/u.  Answered their questions at this time.  Pt understands and agrees to the plan.  The pt has remained hemodynamically stable through ED course and is stable for discharge.    I discussed with patient  and/or family/caretaker that evaluation in the ED does not suggest any emergent or life threatening medical conditions requiring immediate intervention beyond what was provided in the ED, and I believe patient is safe for discharge.  Regardless, an unremarkable evaluation in the ED does not preclude the development or presence of a serious of life threatening condition. As such, patient was instructed to return immediately for any worsening or change in current symptoms.     MIPS Measures     Smoker? No     Hypertension: History of Hypertension: The patient has elevated blood pressure (higher than 120/80) while being treated in the ED but has a history of hypertension.       Medical Decision Making           Additional MDM:   Hypertension: The patient has hypertensive urgency (systolic BP > 180 and/or diastolic BP > 110 with no end organ damage). Medications given: Hydralazine IVP. Response: The patient's BP required multiple drugs to control their hypertension. The patient's condition was felt to be stable. D/c to home.    NIH Stroke Scale:   Interval = baseline (upon arrival/admit)  Level of consciousness = 0 - alert  LOC questions = 0 - answers both correctly  LOC commands = 0 - performs both correctly  Best gaze = 0 - normal  Visual = 0 - no visual loss  Facial palsy = 0 - normal  Motor left arm =  0 - no drift  Motor right arm =  0 - no drift  Motor left leg = 0 - no drift  Motor right leg =  0 - no drift  Limb ataxia = 0 - absent  Sensory = 0 - normal  Best language = 0 - no aphasia  Dysarthria = 0 - normal articulation  Extinction and inattention = 0 - no neglect  NIH Stroke Scale Total = 0           Medical Decision Making  Persistently high blood pressures over the weekend.  No neurologic symptoms, chest pain, chest pressure, shortness of breath    Differential diagnosis includes:  Hypertensive urgency, anxiety, noncompliance, dietary indiscretion (high salt diet).    Patient reports being compliant with  her medication.  Patient is neurologically intact in the emergency room.  Presenting blood pressure 234/105.  Patient had IV established.  Patient given 10 mg of IV hydralazine.  Patient given 100 mg of losartan, 25 mg of hydrochlorothiazide.    Labs:  Urinalysis negative, white cell count normal, hemoglobin/hematocrit 130.3/31.6, previous in September was 10 /31.7.  CMP normal.  .  Troponin not ordered as patient did not experience any dizziness, chest pain, chest pressure, indigestion.  EKG was compared to previous EKG dated August 21, 2018 no acute changes.  EKG did not show STEMI.  Patient's blood pressure gradually declined to the 180s.  No new neurologic symptoms noted.  Patient took her home dose of metoprolol as well as verapamil.  At discharge, patient was able to ambulate to the bathroom.  Blood pressure was 164/86.  Patient was encouraged to follow low-salt diet, keep log of blood pressure.  Follow up with her primary care physician.  We    Amount and/or Complexity of Data Reviewed  External Data Reviewed: labs and ECG.  Labs: ordered. Decision-making details documented in ED Course.  Radiology: ordered and independent interpretation performed. Decision-making details documented in ED Course.  ECG/medicine tests: ordered and independent interpretation performed. Decision-making details documented in ED Course.    Risk  Prescription drug management.    Critical Care  Total time providing critical care: 31 minutes        Coding    Prescription Management: I performed a review of the patient's current Rx medication list as input by nursing staff.    Discharge Medication List as of 11/1/2023  2:03 PM        CONTINUE these medications which have NOT CHANGED    Details   amoxicillin (AMOXIL) 500 MG Tab Take by mouth., Starting Fri 10/27/2023, Historical Med      estradioL (ESTRACE) 0.01 % (0.1 mg/gram) vaginal cream estradiol 0.01% (0.1 mg/gram) vaginal cream, Historical Med      hydrALAZINE (APRESOLINE)  "25 MG tablet Take 50 mg by mouth every 12 (twelve) hours., Historical Med      losartan-hydrochlorothiazide 100-25 mg (HYZAAR) 100-25 mg per tablet Take 1 tablet by mouth once daily., Starting 3/4/2015, Until Discontinued, Normal      metoprolol tartrate (LOPRESSOR) 100 MG tablet Take 1 tablet (100 mg total) by mouth once daily., Starting 3/4/2015, Until Discontinued, Normal      nitrofurantoin, macrocrystal-monohydrate, (MACROBID) 100 MG capsule Take 100 mg by mouth., Starting Tue 10/17/2023, Historical Med      verapamil (CALAN-SR) 240 MG CR tablet Take 1 tablet (240 mg total) by mouth once daily., Starting 3/4/2015, Until Discontinued, Normal      LORazepam (ATIVAN) 0.5 MG tablet Take 0.5 tablets (0.25 mg total) by mouth nightly as needed (insomnia/anxiety)., Starting Sat 9/24/2022, Until Mon 10/24/2022 at 2359, Print              Discussed case with:N/A    Portions of this note may have been created with voice recognition software. Occasional "wrong-word" or "sound-a-like" substitutions may have occurred due to the inherent limitations of voice recognition software. Please, read the note carefully and recognize, using context, where substitutions have occurred.          Clinical Impression       ICD-10-CM ICD-9-CM   1. Elevated blood pressure reading  R03.0 796.2   2. Hypertension  I10 401.9   3. Anemia, unspecified type  D64.9 285.9         ED Disposition     Disposition: Discharge to home  Patient condition: Good    ED Follow-up   Follow-up Information       Omid Morris MD In 2 days.    Specialty: Pathology  Why: Keep log of blood pressure.  Return to emergency department for chest pain, chest pressure, shortness of breath, difficulty breathing, new numbness or weakness to 1 side, vision changes, or other concerns  Contact information:  [address] [place] [address] [address]  [facility] [address]  [phone]                                      Kymberly Peck,   11/02/23 1233    "

## 2024-02-24 ENCOUNTER — HOSPITAL ENCOUNTER (EMERGENCY)
Facility: HOSPITAL | Age: 79
Discharge: HOME | End: 2024-02-24
Attending: EMERGENCY MEDICINE
Payer: MEDICARE

## 2024-02-24 VITALS
OXYGEN SATURATION: 100 % | BODY MASS INDEX: 23.97 KG/M2 | WEIGHT: 139.69 LBS | TEMPERATURE: 99 F | DIASTOLIC BLOOD PRESSURE: 86 MMHG | RESPIRATION RATE: 18 BRPM | SYSTOLIC BLOOD PRESSURE: 185 MMHG | HEART RATE: 68 BPM

## 2024-02-24 DIAGNOSIS — L02.91 ABSCESS: Primary | ICD-10-CM

## 2024-02-24 PROCEDURE — 10060 I&D ABSCESS SIMPLE/SINGLE: CPT | Mod: ER

## 2024-02-24 PROCEDURE — 99282 EMERGENCY DEPT VISIT SF MDM: CPT | Mod: 25,ER

## 2024-02-24 PROCEDURE — 25000003 PHARM REV CODE 250: Mod: ER | Performed by: NURSE PRACTITIONER

## 2024-02-24 RX ORDER — LIDOCAINE HYDROCHLORIDE 10 MG/ML
5 INJECTION, SOLUTION EPIDURAL; INFILTRATION; INTRACAUDAL; PERINEURAL
Status: COMPLETED | OUTPATIENT
Start: 2024-02-24 | End: 2024-02-24

## 2024-02-24 RX ADMIN — LIDOCAINE HYDROCHLORIDE 50 MG: 10 INJECTION, SOLUTION EPIDURAL; INFILTRATION; INTRACAUDAL at 06:02

## 2024-02-25 NOTE — ED PROVIDER NOTES
Encounter Date: 2/24/2024       History     Chief Complaint   Patient presents with    Abscess     Left shoulder blade x 1 week. Draining.      Of left-sided upper back abscess for several weeks.        Review of patient's allergies indicates:  No Known Allergies  Past Medical History:   Diagnosis Date    Anemia     Anxiety     Bronchitis     Enlarged heart     Hyperlipidemia     Hypertension      Past Surgical History:   Procedure Laterality Date    TUBAL LIGATION      VAGINAL PROLAPSE REPAIR  09/10/2013    Uterus prolapse/ [facility]     Family History   Problem Relation Age of Onset    Stroke Brother      Social History     Tobacco Use    Smoking status: Never    Smokeless tobacco: Never   Substance Use Topics    Alcohol use: Yes     Comment: occasional     Drug use: No     Review of Systems   Constitutional:  Negative for fever.   HENT:  Negative for sore throat.    Respiratory:  Negative for shortness of breath.    Cardiovascular:  Negative for chest pain.   Gastrointestinal:  Negative for nausea.   Genitourinary:  Negative for dysuria.   Musculoskeletal:  Negative for back pain.   Skin:  Negative for rash.   Neurological:  Negative for weakness.   Hematological:  Does not bruise/bleed easily.       Physical Exam     Initial Vitals [02/24/24 1803]   BP Pulse Resp Temp SpO2   (!) 185/86 68 18 98.9 °F (37.2 °C) 100 %      MAP       --         Physical Exam    Nursing note and vitals reviewed.  Constitutional: She appears well-developed and well-nourished. She is not diaphoretic. She is active.  Non-toxic appearance. No distress.   HENT:   Head: Normocephalic and atraumatic.   Eyes: Conjunctivae are normal. Right eye exhibits no discharge. Left eye exhibits no discharge. No scleral icterus.   Neck:   Normal range of motion.  Cardiovascular:  Normal rate, regular rhythm and intact distal pulses.           No murmur heard.  Pulmonary/Chest: Breath sounds normal. No respiratory distress. She has no wheezes.    Abdominal: She exhibits no distension.   Musculoskeletal:         General: No tenderness. Normal range of motion.      Cervical back: Normal range of motion.     Neurological: She is alert and oriented to person, place, and time. No cranial nerve deficit. GCS score is 15. GCS eye subscore is 4. GCS verbal subscore is 5. GCS motor subscore is 6.   Skin: Skin is warm and dry. Capillary refill takes less than 2 seconds. No rash noted.   Left upper shoulder blade abscess   Psychiatric: She has a normal mood and affect. Her behavior is normal. Judgment and thought content normal.         ED Course   I & D - Incision and Drainage    Date/Time: 2/24/2024 2:53 PM  Location procedure was performed: [facility] EMERGENCY DEPARTMENT    Performed by: Armin Lawrence NP  Authorized by: Regan Cueto MD  Type: abscess  Body area: trunk  Location details: back  Anesthesia: local infiltration    Anesthesia:  Local Anesthetic: lidocaine 1% without epinephrine  Scalpel size: 11  Incision type: single straight  Incision depth: dermal  Complexity: simple  Drainage: pus  Drainage amount: moderate  Wound treatment: incision and drainage  Packing material: 1/4 in gauze  Complications: No    Incision depth: dermal        Labs Reviewed - No data to display       Imaging Results    None          Medications   LIDOcaine (PF) 10 mg/ml (1%) injection 50 mg (50 mg Infiltration Given 2/24/24 1819)     Medical Decision Making  Risk  Prescription drug management.                                      Clinical Impression:  Final diagnoses:  [L02.91] Abscess (Primary)          ED Disposition Condition    Discharge Stable          ED Prescriptions    None       Follow-up Information       Follow up With Specialties Details Why Contact Info    Omid Morris MD Pathology Schedule an appointment as soon as possible for a visit  As needed [address] [place] [address] [address]  [facility] [address]  [phone]               Armin Lawrence,  NP  02/25/24 1454

## 2024-02-27 ENCOUNTER — TELEPHONE (OUTPATIENT)
Dept: PODIATRY | Facility: CLINIC | Age: 79
End: 2024-02-27
Payer: MEDICARE

## 2024-02-27 NOTE — TELEPHONE ENCOUNTER
Spoke with the patient an scheduled her with Dr Pino on 3-4-24 at 1:15 pm                     ----- Message from Radha Abel sent at 2/27/2024 12:29 PM CST -----  Contact: [place]  Type:  Sooner Apoointment Request    Caller is requesting a sooner appointment. Caller will not accept being placed on the waitlist and is requesting a message be sent to doctor.  Name of Caller:[place]  When is the first available appointment?2/27/24  Symptoms: Blister/nail care  Would the patient rather a call back or a response via [facility]? call  Best Call Back Number: [phone]   Additional Information: Patient request to be seen on 3/4/24, if available.  Thank you,  BRISA

## 2024-03-04 ENCOUNTER — OFFICE VISIT (OUTPATIENT)
Dept: PODIATRY | Facility: CLINIC | Age: 79
End: 2024-03-04
Payer: MEDICARE

## 2024-03-04 DIAGNOSIS — M21.619 ASYMPTOMATIC BUNION: ICD-10-CM

## 2024-03-04 DIAGNOSIS — M79.671 BILATERAL FOOT PAIN: ICD-10-CM

## 2024-03-04 DIAGNOSIS — L60.3 ONYCHODYSTROPHY: ICD-10-CM

## 2024-03-04 DIAGNOSIS — M79.672 BILATERAL FOOT PAIN: ICD-10-CM

## 2024-03-04 DIAGNOSIS — Q82.8 POROKERATOSIS: Primary | ICD-10-CM

## 2024-03-04 PROCEDURE — 1125F AMNT PAIN NOTED PAIN PRSNT: CPT | Mod: CPTII,S$GLB,, | Performed by: PODIATRIST

## 2024-03-04 PROCEDURE — 1101F PT FALLS ASSESS-DOCD LE1/YR: CPT | Mod: CPTII,S$GLB,, | Performed by: PODIATRIST

## 2024-03-04 PROCEDURE — 99213 OFFICE O/P EST LOW 20 MIN: CPT | Mod: S$GLB,,, | Performed by: PODIATRIST

## 2024-03-04 PROCEDURE — 1159F MED LIST DOCD IN RCRD: CPT | Mod: CPTII,S$GLB,, | Performed by: PODIATRIST

## 2024-03-04 PROCEDURE — 99999 PR PBB SHADOW E&M-EST. PATIENT-LVL II: CPT | Mod: PBBFAC,,, | Performed by: PODIATRIST

## 2024-03-04 PROCEDURE — 3288F FALL RISK ASSESSMENT DOCD: CPT | Mod: CPTII,S$GLB,, | Performed by: PODIATRIST

## 2024-03-04 PROCEDURE — 1160F RVW MEDS BY RX/DR IN RCRD: CPT | Mod: CPTII,S$GLB,, | Performed by: PODIATRIST

## 2024-03-04 NOTE — PROGRESS NOTES
Subjective:       Patient ID: Janet Hamilton is a 78 y.o. female.    Chief Complaint: Callouses (Patient complains of 8/10 pain at present to bilateral toe callouses. She describes the pain as burning and sharp shooting. )    HPI: Janet Hamilton presents to the office today, with areas of concern to the plantar aspect of the right and left foot at the distal aspect of the toe.  No history of hammertoes.  Causing 8/10 pain.  States that it feels as if he/she is walking on rocks.  Denies any puncture wounds or injuries.  States this pain is been ongoing for some time without significant improvement.  No specific memory of walking barefoot or causing injury.  States visualization of new developed lesions to the foot.  No signs of acute infection.    Review of patient's allergies indicates:  No Known Allergies    Past Medical History:   Diagnosis Date    Anemia     Anxiety     Bronchitis     Enlarged heart     Hyperlipidemia     Hypertension        Family History   Problem Relation Age of Onset    Stroke Brother        Social History     Socioeconomic History    Marital status:      Spouse name: Solomon    Number of children: 3   Occupational History    Occupation: Retired   Tobacco Use    Smoking status: Never    Smokeless tobacco: Never   Substance and Sexual Activity    Alcohol use: Yes     Comment: occasional     Drug use: No    Sexual activity: Not Currently       Past Surgical History:   Procedure Laterality Date    TUBAL LIGATION      VAGINAL PROLAPSE REPAIR  09/10/2013    Uterus prolapse/ [facility]       Review of Systems       Objective:   There were no vitals taken for this visit.    X-Ray Chest AP Portable  Narrative: EXAMINATION:  XR CHEST AP PORTABLE    CLINICAL HISTORY:  Essential (primary) hypertension    FINDINGS:  Single view of the chest.  Comparison 07/17/2017    Cardiac silhouette is normal.  The lungs demonstrate no evidence of active disease.  Mild atelectasis left lung base.  No  evidence of pleural effusion or pneumothorax.  Bones appear intact.  Moderate degenerative changes and moderate atherosclerotic disease.  Impression: No acute process seen.    Electronically signed by: Reid Márquez MD  Date:    11/01/2023  Time:    11:27  CT Head Without Contrast  Narrative: EXAMINATION:  CT HEAD WITHOUT CONTRAST    CLINICAL HISTORY:  Syncope, recurrent; Essential (primary) hypertension    TECHNIQUE:  Low dose axial CT images obtained throughout the head without intravenous contrast. Sagittal and coronal reconstructions were performed.  All CT scans at this facility use dose modulation, iterative reconstruction and/or weight based dosing when appropriate to reduce radiation dose to as low as reasonably achievable.    COMPARISON:  06/26/2021    FINDINGS:  Intracranial compartment:    The brain parenchyma demonstrates areas of decreased attenuation with moderate periventricular white matter consistent with chronic microvascular ischemic changes..  No parenchymal mass, hemorrhage, edema or major vascular distribution infarct.  Vascular calcifications are noted.    Mild prominence of the sulci and ventricles are consistent with age-related involutional changes.    No extra-axial blood or fluid collections.    Skull/extracranial contents (limited evaluation): No fracture. Mastoid air cells and paranasal sinuses are essentially clear.  Impression: Chronic microvascular ischemic changes.    Electronically signed by: Reid Márquez MD  Date:    11/01/2023  Time:    11:25       Physical Exam   LOWER EXTREMITY PHYSICAL EXAMINATION    Vascular:  The right dorsalis pedis pulse 2/4 and the right posterior tibial pulse 2/4.  The left dorsalis pedis pulse 2/4 and posterior tibial pulse on the left is 2/4.  Capillary refill is intact.  Pedal hair growth intact    Neurological:  Sharp/dull, light touch, proprioception all intact and equal bilaterally.  Vibratory sensation is intact.  Protective sensation  intact    Musculoskeletal: Manual Muscle Testing is 5/5 with dorsiflexion, plantar flexion, abduction, and adduction.   There is normal range of motion in the forefoot, hindfoot, and Ankle joint.  No evidence of hammertoe deformity.  Asymptomatic bunion deformity present to the right left foot.     Dermatological:  Skin is supple and moist.  There are 3 lesions present to the plantar aspect of the foot which have a resemblance to a plantar porokeratosis.  Centralize core is noted.  There is no acute signs of infection.  No signs of underlying ulceration.  There is no absence of skin line.  No obvious capillary hemorrhaging.      Imaging:       X-Ray Foot Complete Bilateral    Narrative  Comparison: None    Findings:    Generalized osteopenia and bilateral pes planus.  Degenerative changes throughout the feet without acute or healing fracture. Mildly prominent degenerative change at the first MTP joints .  Bilateral calcaneal spurs and vascular calcifications.  No  significant soft tissue swelling.  IMPRESSION:      As above.      Electronically signed by: ELAN PAK III, MD  Date:     07/02/14  Time:    10:00      No results found for this or any previous visit.       No results found for this or any previous visit.          Assessment:     1. Porokeratosis    2. Bilateral foot pain    3. Asymptomatic bunion    4. Onychodystrophy        Plan:     Porokeratosis    Bilateral foot pain    Asymptomatic bunion    Onychodystrophy      We discussed the etiology and pathology associated with acquired plantar porokeratosis.  We discussed the importance of removing the centralize core and alleviating pain discomfort.  We also discussed utilizing a pumice stone at home to reduce callus formation and subsequent recurrence of this area.  Recommend to apply hydrating creams and lotions this area daily to ensure that there is no subsequent buildup.    Porokeratotic skin formation, as outlined within the examination portion of  this note, is surgically debrided with sharp #10/#15 blade, to alleviate discomfort with weight bearing and ambulation, and to lessen the possibility of skin complications, e.g., ulceration due to pressure. No ulceration(s) is are noted with/post debridement. The lesion is completed healed and resolved. No evidence of infection.     We discussed with the patient that she does not qualify for high risk foot care.  She will follow-up as needed for PROC [facility] nail care as previously discussed in note from Dr. Vance Davis as well.     No future appointments.

## 2024-03-06 ENCOUNTER — LAB VISIT (OUTPATIENT)
Dept: LAB | Facility: HOSPITAL | Age: 79
End: 2024-03-06
Attending: INTERNAL MEDICINE
Payer: MEDICARE

## 2024-03-06 DIAGNOSIS — R73.03 DIABETES MELLITUS, LATENT: ICD-10-CM

## 2024-03-06 DIAGNOSIS — I10 ESSENTIAL HYPERTENSION, MALIGNANT: ICD-10-CM

## 2024-03-06 LAB
ANION GAP SERPL CALC-SCNC: 9 MMOL/L (ref 8–16)
BUN SERPL-MCNC: 25 MG/DL (ref 8–23)
CALCIUM SERPL-MCNC: 9.3 MG/DL (ref 8.7–10.5)
CHLORIDE SERPL-SCNC: 106 MMOL/L (ref 95–110)
CHOLEST SERPL-MCNC: 186 MG/DL (ref 120–199)
CHOLEST/HDLC SERPL: 3.1 {RATIO} (ref 2–5)
CO2 SERPL-SCNC: 28 MMOL/L (ref 23–29)
CREAT SERPL-MCNC: 1.1 MG/DL (ref 0.5–1.4)
EST. GFR  (NO RACE VARIABLE): 51.1 ML/MIN/1.73 M^2
ESTIMATED AVG GLUCOSE: 108 MG/DL (ref 68–131)
GLUCOSE SERPL-MCNC: 85 MG/DL (ref 70–110)
HBA1C MFR BLD: 5.4 % (ref 4–5.6)
HDLC SERPL-MCNC: 60 MG/DL (ref 40–75)
HDLC SERPL: 32.3 % (ref 20–50)
LDLC SERPL CALC-MCNC: 117.4 MG/DL (ref 63–159)
NONHDLC SERPL-MCNC: 126 MG/DL
POTASSIUM SERPL-SCNC: 3.9 MMOL/L (ref 3.5–5.1)
SODIUM SERPL-SCNC: 143 MMOL/L (ref 136–145)
TRIGL SERPL-MCNC: 43 MG/DL (ref 30–150)

## 2024-03-06 PROCEDURE — 83036 HEMOGLOBIN GLYCOSYLATED A1C: CPT | Performed by: INTERNAL MEDICINE

## 2024-03-06 PROCEDURE — 80048 BASIC METABOLIC PNL TOTAL CA: CPT | Mod: PO | Performed by: INTERNAL MEDICINE

## 2024-03-06 PROCEDURE — 36415 COLL VENOUS BLD VENIPUNCTURE: CPT | Mod: PO | Performed by: INTERNAL MEDICINE

## 2024-03-06 PROCEDURE — 80061 LIPID PANEL: CPT | Performed by: INTERNAL MEDICINE

## 2024-04-18 ENCOUNTER — HOSPITAL ENCOUNTER (INPATIENT)
Facility: HOSPITAL | Age: 79
LOS: 1 days | Discharge: HOME HEALTH | DRG: 312 | End: 2024-04-20
Attending: EMERGENCY MEDICINE | Admitting: FAMILY MEDICINE
Payer: MEDICARE

## 2024-04-18 DIAGNOSIS — R55 NEAR SYNCOPE: Primary | ICD-10-CM

## 2024-04-18 DIAGNOSIS — R00.1 BRADYCARDIA: ICD-10-CM

## 2024-04-18 DIAGNOSIS — I10 ESSENTIAL HYPERTENSION: ICD-10-CM

## 2024-04-18 DIAGNOSIS — R51.9 HEADACHE: ICD-10-CM

## 2024-04-18 DIAGNOSIS — R55 SYNCOPE, UNSPECIFIED SYNCOPE TYPE: ICD-10-CM

## 2024-04-18 DIAGNOSIS — R07.9 CHEST PAIN: ICD-10-CM

## 2024-04-18 DIAGNOSIS — M25.511 RIGHT SHOULDER PAIN: ICD-10-CM

## 2024-04-18 LAB
ALBUMIN SERPL BCP-MCNC: 3.9 G/DL (ref 3.5–5.2)
ALP SERPL-CCNC: 39 U/L (ref 55–135)
ALT SERPL W/O P-5'-P-CCNC: 14 U/L (ref 10–44)
ANION GAP SERPL CALC-SCNC: 11 MMOL/L (ref 8–16)
AST SERPL-CCNC: 22 U/L (ref 10–40)
BASOPHILS # BLD AUTO: 0.05 K/UL (ref 0–0.2)
BASOPHILS NFR BLD: 1 % (ref 0–1.9)
BILIRUB SERPL-MCNC: 0.3 MG/DL (ref 0.1–1)
BILIRUB UR QL STRIP: NEGATIVE
BNP SERPL-MCNC: 614 PG/ML (ref 0–99)
BUN SERPL-MCNC: 21 MG/DL (ref 8–23)
CALCIUM SERPL-MCNC: 9.5 MG/DL (ref 8.7–10.5)
CHLORIDE SERPL-SCNC: 106 MMOL/L (ref 95–110)
CLARITY UR REFRACT.AUTO: CLEAR
CO2 SERPL-SCNC: 24 MMOL/L (ref 23–29)
COLOR UR AUTO: YELLOW
CREAT SERPL-MCNC: 1 MG/DL (ref 0.5–1.4)
DIFFERENTIAL METHOD BLD: ABNORMAL
EOSINOPHIL # BLD AUTO: 0.1 K/UL (ref 0–0.5)
EOSINOPHIL NFR BLD: 1 % (ref 0–8)
ERYTHROCYTE [DISTWIDTH] IN BLOOD BY AUTOMATED COUNT: 17.6 % (ref 11.5–14.5)
EST. GFR  (NO RACE VARIABLE): 57.3 ML/MIN/1.73 M^2
GLUCOSE SERPL-MCNC: 82 MG/DL (ref 70–110)
GLUCOSE UR QL STRIP: NEGATIVE
HCT VFR BLD AUTO: 34.4 % (ref 37–48.5)
HGB BLD-MCNC: 10.8 G/DL (ref 12–16)
HGB UR QL STRIP: NEGATIVE
IMM GRANULOCYTES # BLD AUTO: 0.01 K/UL (ref 0–0.04)
IMM GRANULOCYTES NFR BLD AUTO: 0.2 % (ref 0–0.5)
KETONES UR QL STRIP: NEGATIVE
LEUKOCYTE ESTERASE UR QL STRIP: NEGATIVE
LYMPHOCYTES # BLD AUTO: 1.3 K/UL (ref 1–4.8)
LYMPHOCYTES NFR BLD: 25.9 % (ref 18–48)
MCH RBC QN AUTO: 27.9 PG (ref 27–31)
MCHC RBC AUTO-ENTMCNC: 31.4 G/DL (ref 32–36)
MCV RBC AUTO: 89 FL (ref 82–98)
MONOCYTES # BLD AUTO: 0.9 K/UL (ref 0.3–1)
MONOCYTES NFR BLD: 17 % (ref 4–15)
NEUTROPHILS # BLD AUTO: 2.8 K/UL (ref 1.8–7.7)
NEUTROPHILS NFR BLD: 54.9 % (ref 38–73)
NITRITE UR QL STRIP: NEGATIVE
NRBC BLD-RTO: 0 /100 WBC
PH UR STRIP: 7 [PH] (ref 5–8)
PLATELET # BLD AUTO: 192 K/UL (ref 150–450)
PMV BLD AUTO: 11 FL (ref 9.2–12.9)
POCT GLUCOSE: 104 MG/DL (ref 70–110)
POTASSIUM SERPL-SCNC: 4.8 MMOL/L (ref 3.5–5.1)
PROT SERPL-MCNC: 7.7 G/DL (ref 6–8.4)
PROT UR QL STRIP: NEGATIVE
RBC # BLD AUTO: 3.87 M/UL (ref 4–5.4)
SODIUM SERPL-SCNC: 141 MMOL/L (ref 136–145)
SP GR UR STRIP: 1.01 (ref 1–1.03)
TROPONIN I SERPL DL<=0.01 NG/ML-MCNC: <0.006 NG/ML (ref 0–0.03)
TROPONIN I SERPL DL<=0.01 NG/ML-MCNC: <0.006 NG/ML (ref 0–0.03)
URN SPEC COLLECT METH UR: NORMAL
UROBILINOGEN UR STRIP-ACNC: NEGATIVE EU/DL
WBC # BLD AUTO: 5.05 K/UL (ref 3.9–12.7)

## 2024-04-18 PROCEDURE — 63600175 PHARM REV CODE 636 W HCPCS: Performed by: NURSE PRACTITIONER

## 2024-04-18 PROCEDURE — 81003 URINALYSIS AUTO W/O SCOPE: CPT | Mod: ER | Performed by: EMERGENCY MEDICINE

## 2024-04-18 PROCEDURE — 82962 GLUCOSE BLOOD TEST: CPT | Mod: ER

## 2024-04-18 PROCEDURE — 83880 ASSAY OF NATRIURETIC PEPTIDE: CPT | Mod: ER | Performed by: EMERGENCY MEDICINE

## 2024-04-18 PROCEDURE — 94761 N-INVAS EAR/PLS OXIMETRY MLT: CPT | Mod: ER

## 2024-04-18 PROCEDURE — 96372 THER/PROPH/DIAG INJ SC/IM: CPT | Performed by: NURSE PRACTITIONER

## 2024-04-18 PROCEDURE — 99285 EMERGENCY DEPT VISIT HI MDM: CPT | Mod: 25,ER

## 2024-04-18 PROCEDURE — G0378 HOSPITAL OBSERVATION PER HR: HCPCS | Mod: ER

## 2024-04-18 PROCEDURE — 93005 ELECTROCARDIOGRAM TRACING: CPT | Mod: ER

## 2024-04-18 PROCEDURE — 84484 ASSAY OF TROPONIN QUANT: CPT | Mod: 91,ER | Performed by: EMERGENCY MEDICINE

## 2024-04-18 PROCEDURE — 85025 COMPLETE CBC W/AUTO DIFF WBC: CPT | Mod: ER | Performed by: EMERGENCY MEDICINE

## 2024-04-18 PROCEDURE — 80053 COMPREHEN METABOLIC PANEL: CPT | Mod: ER | Performed by: EMERGENCY MEDICINE

## 2024-04-18 PROCEDURE — 93010 ELECTROCARDIOGRAM REPORT: CPT | Mod: ,,, | Performed by: INTERNAL MEDICINE

## 2024-04-18 PROCEDURE — G0378 HOSPITAL OBSERVATION PER HR: HCPCS

## 2024-04-18 RX ORDER — NALOXONE HCL 0.4 MG/ML
0.02 VIAL (ML) INJECTION
Status: DISCONTINUED | OUTPATIENT
Start: 2024-04-18 | End: 2024-04-20 | Stop reason: HOSPADM

## 2024-04-18 RX ORDER — PROMETHAZINE HYDROCHLORIDE 25 MG/1
25 TABLET ORAL EVERY 6 HOURS PRN
Status: DISCONTINUED | OUTPATIENT
Start: 2024-04-18 | End: 2024-04-20 | Stop reason: HOSPADM

## 2024-04-18 RX ORDER — ACETAMINOPHEN 650 MG/1
650 SUPPOSITORY RECTAL EVERY 4 HOURS PRN
Status: DISCONTINUED | OUTPATIENT
Start: 2024-04-18 | End: 2024-04-20 | Stop reason: HOSPADM

## 2024-04-18 RX ORDER — ENOXAPARIN SODIUM 100 MG/ML
40 INJECTION SUBCUTANEOUS EVERY 24 HOURS
Status: DISCONTINUED | OUTPATIENT
Start: 2024-04-18 | End: 2024-04-20 | Stop reason: HOSPADM

## 2024-04-18 RX ORDER — IBUPROFEN 200 MG
16 TABLET ORAL
Status: DISCONTINUED | OUTPATIENT
Start: 2024-04-18 | End: 2024-04-20 | Stop reason: HOSPADM

## 2024-04-18 RX ORDER — ALUMINUM HYDROXIDE, MAGNESIUM HYDROXIDE, AND SIMETHICONE 1200; 120; 1200 MG/30ML; MG/30ML; MG/30ML
30 SUSPENSION ORAL 4 TIMES DAILY PRN
Status: DISCONTINUED | OUTPATIENT
Start: 2024-04-18 | End: 2024-04-20 | Stop reason: HOSPADM

## 2024-04-18 RX ORDER — SODIUM CHLORIDE 0.9 % (FLUSH) 0.9 %
3 SYRINGE (ML) INJECTION EVERY 12 HOURS PRN
Status: DISCONTINUED | OUTPATIENT
Start: 2024-04-18 | End: 2024-04-20 | Stop reason: HOSPADM

## 2024-04-18 RX ORDER — GLUCAGON 1 MG
1 KIT INJECTION
Status: DISCONTINUED | OUTPATIENT
Start: 2024-04-18 | End: 2024-04-20 | Stop reason: HOSPADM

## 2024-04-18 RX ORDER — TALC
6 POWDER (GRAM) TOPICAL NIGHTLY PRN
Status: DISCONTINUED | OUTPATIENT
Start: 2024-04-18 | End: 2024-04-20 | Stop reason: HOSPADM

## 2024-04-18 RX ORDER — ONDANSETRON HYDROCHLORIDE 2 MG/ML
4 INJECTION, SOLUTION INTRAVENOUS EVERY 8 HOURS PRN
Status: DISCONTINUED | OUTPATIENT
Start: 2024-04-18 | End: 2024-04-20 | Stop reason: HOSPADM

## 2024-04-18 RX ORDER — POLYETHYLENE GLYCOL 3350 17 G/17G
17 POWDER, FOR SOLUTION ORAL DAILY PRN
Status: DISCONTINUED | OUTPATIENT
Start: 2024-04-18 | End: 2024-04-20 | Stop reason: HOSPADM

## 2024-04-18 RX ORDER — HYDROCODONE BITARTRATE AND ACETAMINOPHEN 5; 325 MG/1; MG/1
1 TABLET ORAL EVERY 6 HOURS PRN
Status: DISCONTINUED | OUTPATIENT
Start: 2024-04-18 | End: 2024-04-20

## 2024-04-18 RX ORDER — MORPHINE SULFATE 2 MG/ML
2 INJECTION, SOLUTION INTRAMUSCULAR; INTRAVENOUS EVERY 4 HOURS PRN
Status: DISCONTINUED | OUTPATIENT
Start: 2024-04-18 | End: 2024-04-20

## 2024-04-18 RX ORDER — ACETAMINOPHEN 325 MG/1
650 TABLET ORAL EVERY 8 HOURS PRN
Status: DISCONTINUED | OUTPATIENT
Start: 2024-04-18 | End: 2024-04-20 | Stop reason: HOSPADM

## 2024-04-18 RX ORDER — IBUPROFEN 200 MG
24 TABLET ORAL
Status: DISCONTINUED | OUTPATIENT
Start: 2024-04-18 | End: 2024-04-20 | Stop reason: HOSPADM

## 2024-04-18 RX ORDER — SIMETHICONE 80 MG
1 TABLET,CHEWABLE ORAL 4 TIMES DAILY PRN
Status: DISCONTINUED | OUTPATIENT
Start: 2024-04-18 | End: 2024-04-20 | Stop reason: HOSPADM

## 2024-04-18 RX ADMIN — ENOXAPARIN SODIUM 40 MG: 40 INJECTION SUBCUTANEOUS at 11:04

## 2024-04-18 NOTE — ED PROVIDER NOTES
"Emergency Medicine Provider Note - 4/18/2024       History     Chief Complaint   Patient presents with    Fall     [place] lightheaded and fell c/o R shoulder pain and headache.       Allergies:  Review of patient's allergies indicates:  No Known Allergies     History of Present Illness   HPI    4/18/2024, 12:46 PM  The history is provided by the  AASI, Daughter (Guerita),   and patient    Attempted to get collateral information from patient's sister who witnessed the event-message left on phone.  Did not return call (Sherry [phone])    Janet Hamilton is a 78 y.o. female presenting to the ED for feeling lightheaded and right-sided shoulder pain, headache. ? Syncopal event versus fall.     Patient woke up this morning.  She would eaten breakfast consisting of frosted flakes, banana, half a biscuit, and coffee.  She would gotten up to put the tray away on the counter.  She felt lightheaded.  And then had loss of consciousness for "a second or 2."   (Unable to get collateral information from witness).  Patient denies any loss of control of bowel or bladder, tongue contusion.  911 was called.  Patient denied any medication changes, nausea, vomiting, diarrhea, blood in stool, black tarry stool, chest pain, chest pressure, shortness of breath, urinary complaints.  She notes that she has right scapular pain as well as headache (Posterior right occipital pain).  The headache did not precede the syncopal event.  Patient was told she has an "enlarged heart." Records reviewed, no echocardiogram in the epic system.  Patient is followed by Dr. Huang Cardiology.    According to [facility], patient had a fall.  Presenting blood pressure was 113/63.  Patient complaining of right shoulder pain.    Rhythm strip reviewed.        [facility]     PCP: Omid Morris MD     Past Medical History:  Past Medical History:   Diagnosis Date    Anemia     Anxiety     Bronchitis     " Enlarged heart     Hyperlipidemia     Hypertension        Past Surgical History:  Past Surgical History:   Procedure Laterality Date    TUBAL LIGATION      VAGINAL PROLAPSE REPAIR  09/10/2013    Uterus prolapse/ [facility]         Family History:  Family History   Problem Relation Name Age of Onset    Stroke Brother         Social History:  Social History     Tobacco Use    Smoking status: Never    Smokeless tobacco: Never   Substance and Sexual Activity    Alcohol use: Yes     Comment: occasional     Drug use: No    Sexual activity: Not Currently        Review of Systems   Review of Systems   Constitutional:  Negative for fever.   Eyes:  Negative for visual disturbance.   Respiratory:  Negative for shortness of breath.    Cardiovascular:  Negative for chest pain.        (-) bowel incontinence   Gastrointestinal:  Negative for abdominal pain, blood in stool, nausea and vomiting.   Genitourinary:  Negative for dysuria.        (-) bladder incontinence   Musculoskeletal:  Positive for arthralgias (Right scapula) and neck pain. Negative for back pain and neck stiffness.   Skin:  Negative for rash.   Neurological:  Positive for syncope ((+/-)), light-headedness and headaches. Negative for facial asymmetry, speech difficulty, weakness and numbness.   Hematological:  Does not bruise/bleed easily.      Physical Exam     Initial Vitals [04/18/24 1219]   BP Pulse Resp Temp SpO2   128/70 62 17 97.7 °F (36.5 °C) 98 %      MAP       --          Physical Exam  Skin:             Nursing Notes and Vital Signs Reviewed.  Constitutional: Patient is in no apparent distress. Well-developed and well-nourished.  Head: Atraumatic. Normocephalic.  No bruising or swelling noted to the occipital region.  Eyes: PERRL. EOM intact. Conjunctivae are not pale. No scleral icterus.  No periorbital contusion.  ENT: Mucous membranes are moist. Oropharynx is clear and symmetric.  No hemotympanum.  No septal hematoma.No malocclusion.  No posterior  auricular hematoma.  Neck: Supple. Full ROM. No lymphadenopathy.  (+) paraspinal TTP  C4 C-spine tenderness.  Patient able to rotate neck 45° without pain.  Cardiovascular: Regular rate. Regular rhythm. No murmurs, rubs, or gallops. Distal pulses are 2+ and symmetric.  Pulmonary/Chest: No respiratory distress. Clear to auscultation bilaterally. No wheezing or rales.  No bruising or seatbelt sign.  No crepitance.  Abdominal: Soft and non-distended.  There is no tenderness.  No rebound, guarding, or rigidity. Good bowel sounds.  No bruising or seatbelt sign.  Genitourinary: No CVA tenderness  Musculoskeletal: Moves all extremities. No obvious deformities. No edema. No calf tenderness.  T-spine:  No midline T-spine tenderness, bony step-off, or bruising noted.  L-spine:  No midline L-spine tenderness, bony step-off, or bruising noted.  Intact median, ulnar, and radial nerves both motor and sensory.  Patient able lift arms above head.  Tender to palpation along the right scapula.  No bruising or swelling noted.  Skin: Warm and dry.  Neurological:  Alert, awake, and appropriate.  Normal speech.  No acute focal neurological deficits are appreciated.  Cranial nerves 2-12 intact.  GCS 15.   Psychiatric: Normal affect. Good eye contact. Appropriate in content.       ED Course   ED Procedures:  Procedures    ED Vital Signs:  Vitals:    04/18/24 1347 04/18/24 1353 04/18/24 1355 04/18/24 1400   BP: (!) 147/69 135/63 135/63 (!) 160/78   Pulse: 67  65 62   Resp:       Temp:       TempSrc:       SpO2: 100%  96% 97%   Weight:       Height:        04/18/24 1449 04/18/24 1504 04/18/24 1647 04/18/24 1648   BP: (!) 163/74 (!) 156/72 (!) 171/81    Pulse: (!) 59 (!) 56  (!) 49   Resp: 14 17  16   Temp:       TempSrc:       SpO2: 100% 99%  98%   Weight:       Height:        04/18/24 1847 04/18/24 2017 04/18/24 2034 04/18/24 2202   BP: (!) 173/80 (!) 164/67 (!) 159/75 (!) 170/73   Pulse: (!) 49 62 64 (!) 55   Resp: 18 18 (!) 21    Temp:     "97.7 °F (36.5 °C)   TempSrc:       SpO2: 99% 99% 99% 98%   Weight:    63 kg (138 lb 14.2 oz)   Height:    5' 2" (1.575 m)    04/18/24 2203 04/18/24 2212 04/19/24 0420   BP: (!) 170/73     Pulse: (!) 55 (!) 59 (!) 48   Resp: 17     Temp: 97.1 °F (36.2 °C)     TempSrc: Oral     SpO2: 99%     Weight: 62.7 kg (138 lb 3.7 oz)     Height:          Abnormal Lab Results:  Labs Reviewed   CBC W/ AUTO DIFFERENTIAL - Abnormal; Notable for the following components:       Result Value    RBC 3.87 (*)     Hemoglobin 10.8 (*)     Hematocrit 34.4 (*)     MCHC 31.4 (*)     RDW 17.6 (*)     Mono % 17.0 (*)     All other components within normal limits   COMPREHENSIVE METABOLIC PANEL - Abnormal; Notable for the following components:    Alkaline Phosphatase 39 (*)     eGFR 57.3 (*)     All other components within normal limits   B-TYPE NATRIURETIC PEPTIDE - Abnormal; Notable for the following components:     (*)     All other components within normal limits   TROPONIN I   TROPONIN I   URINALYSIS, REFLEX TO URINE CULTURE    Narrative:     Specimen Source->Urine   POCT GLUCOSE   POCT GLUCOSE MONITORING CONTINUOUS        All Lab Results:  Results for orders placed or performed during the hospital encounter of 04/18/24   CBC auto differential   Result Value Ref Range    WBC 5.05 3.90 - 12.70 K/uL    RBC 3.87 (L) 4.00 - 5.40 M/uL    Hemoglobin 10.8 (L) 12.0 - 16.0 g/dL    Hematocrit 34.4 (L) 37.0 - 48.5 %    MCV 89 82 - 98 fL    MCH 27.9 27.0 - 31.0 pg    MCHC 31.4 (L) 32.0 - 36.0 g/dL    RDW 17.6 (H) 11.5 - 14.5 %    Platelets 192 150 - 450 K/uL    MPV 11.0 9.2 - 12.9 fL    Immature Granulocytes 0.2 0.0 - 0.5 %    Gran # (ANC) 2.8 1.8 - 7.7 K/uL    Immature Grans (Abs) 0.01 0.00 - 0.04 K/uL    Lymph # 1.3 1.0 - 4.8 K/uL    Mono # 0.9 0.3 - 1.0 K/uL    Eos # 0.1 0.0 - 0.5 K/uL    Baso # 0.05 0.00 - 0.20 K/uL    nRBC 0 0 /100 WBC    Gran % 54.9 38.0 - 73.0 %    Lymph % 25.9 18.0 - 48.0 %    Mono % 17.0 (H) 4.0 - 15.0 %    Eosinophil % " 1.0 0.0 - 8.0 %    Basophil % 1.0 0.0 - 1.9 %    Differential Method Automated    Comprehensive metabolic panel   Result Value Ref Range    Sodium 141 136 - 145 mmol/L    Potassium 4.8 3.5 - 5.1 mmol/L    Chloride 106 95 - 110 mmol/L    CO2 24 23 - 29 mmol/L    Glucose 82 70 - 110 mg/dL    BUN 21 8 - 23 mg/dL    Creatinine 1.0 0.5 - 1.4 mg/dL    Calcium 9.5 8.7 - 10.5 mg/dL    Total Protein 7.7 6.0 - 8.4 g/dL    Albumin 3.9 3.5 - 5.2 g/dL    Total Bilirubin 0.3 0.1 - 1.0 mg/dL    Alkaline Phosphatase 39 (L) 55 - 135 U/L    AST 22 10 - 40 U/L    ALT 14 10 - 44 U/L    eGFR 57.3 (A) >60 mL/min/1.73 m^2    Anion Gap 11 8 - 16 mmol/L   Troponin I #1   Result Value Ref Range    Troponin I <0.006 0.000 - 0.026 ng/mL   Troponin I #2   Result Value Ref Range    Troponin I <0.006 0.000 - 0.026 ng/mL   BNP   Result Value Ref Range     (H) 0 - 99 pg/mL   Urinalysis, Reflex to Urine Culture Urine, Clean Catch    Specimen: Urine   Result Value Ref Range    Specimen UA Urine, Clean Catch     Color, UA Yellow Yellow, Straw, [place]    Appearance, UA Clear Clear    pH, UA 7.0 5.0 - 8.0    Specific Gravity, UA 1.015 1.005 - 1.030    Protein, UA Negative Negative    Glucose, UA Negative Negative    Ketones, UA Negative Negative    Bilirubin (UA) Negative Negative    Occult Blood UA Negative Negative    Nitrite, UA Negative Negative    Urobilinogen, UA Negative <2.0 EU/dL    Leukocytes, UA Negative Negative   POCT glucose   Result Value Ref Range    POCT Glucose 104 70 - 110 mg/dL         The EKG was ordered, reviewed, and independently interpreted by the ED provider:      ECG Results              EKG 12-lead (Preliminary result)  Result time 04/18/24 14:36:59      Wet Read by Kymberly Peck DO (04/18/24 14:36:59, [facility] - Emergency Dept, Emergency Medicine)    Normal sinus rhythm.  Left axis deviation.  Left anterior fascicular block.  No ST segment elevation.  No STEMI                                    Imaging  Results:  Imaging Results              CT Cervical Spine Without Contrast (Final result)  Result time 04/18/24 14:09:36      Final result by Jennifer Dominguez MD (Timothy) (04/18/24 14:09:36)                   Impression:      Unremarkable exam.    All CT scans at this facility use dose modulation, iterative reconstructions, and/or weight base dosing when appropriate to reduce radiation dose to as low as reasonably achievable.      Electronically signed by: Jennifer Dominguez MD  Date:    04/18/2024  Time:    14:09               Narrative:    EXAMINATION:  CT CERVICAL SPINE WITHOUT CONTRAST    CLINICAL HISTORY:  Neck trauma (Age >= 65y);    TECHNIQUE:  Thin section axial images were acquired. Reformatted images were generated in the sagittal and coronal planes.    COMPARISON:  None    FINDINGS:  No fracture, precervical soft tissue swelling, or subluxation identified. No CT evidence of central canal stenosis or traumatic disc herniation.  Multilevel degenerative                                       X-Ray Shoulder Trauma Right (Final result)  Result time 04/18/24 13:41:17      Final result by Trenton Haile MD (04/18/24 13:41:17)                   Impression:      Negative exam.      Electronically signed by: Trenton Haile MD  Date:    04/18/2024  Time:    13:41               Narrative:    EXAMINATION:  XR SHOULDER TRAUMA 3 VIEW RIGHT    CLINICAL HISTORY:  Pain in right shoulder    TECHNIQUE:  Standard radiography performed.    COMPARISON:  None    FINDINGS:  Bone density and architecture are normal.  No acute findings.                                       X-Ray Chest AP Portable (Final result)  Result time 04/18/24 13:41:51      Final result by Trenton Haile MD (04/18/24 13:41:51)                   Impression:      No acute findings.      Electronically signed by: Trenton Haile MD  Date:    04/18/2024  Time:    13:41               Narrative:    EXAMINATION:  XR CHEST AP PORTABLE    CLINICAL  HISTORY:  Respiratory distress.,    COMPARISON:  11/01/2023 x-ray.    FINDINGS:  Borderline cardiomegaly.  Mild aortic atherosclerosis.    Clear lungs with no acute infiltrate or failure.                                       CT Head Without Contrast (Final result)  Result time 04/18/24 13:39:06      Final result by Trenton Haile MD (04/18/24 13:39:06)                   Impression:      Age-related atrophy.  No acute findings.      Electronically signed by: Trenton Haile MD  Date:    04/18/2024  Time:    13:39               Narrative:    EXAMINATION:  CT HEAD WITHOUT CONTRAST    CLINICAL HISTORY:  Headache, unspecified Head trauma, minor (Age >= 65y);    TECHNIQUE:  Standard noncontrast CT of the brain.    All CT scans at this facility are performed  using dose modulation techniques as appropriate to performed exam including the following:  automated exposure control; adjustment of mA and/or kV according to the patients size (this includes techniques or standardized protocols for targeted exams where dose is matched to indication/reason for exam: i.e. extremities or head);  iterative reconstruction technique.    COMPARISON:  11/01/2023 MRI    FINDINGS:  Brain: The ventricles are moderately enlarged consistent with volume loss.  There are advanced white matter changes consistent with age-related small vessel ischemic degeneration.  Chronic appearing right thalamic lacunar infarct.    No acute edema, hemorrhage or mass effect is present.    Skull: The skullbase is intact.                                       Type of Interpretation: ED Physician (Independently Interpreted).  Interpretation: Portable chest x-ray:  Borderline cardiomegaly.  Otherwise lungs clear    Right shoulder:  No fracture.        The Emergency Provider reviewed the vital signs and test results, which are outlined above.     ED Discussion   ED Medication(s):  Medications   sodium chloride 0.9% flush 3 mL (has no administration in time  range)   melatonin tablet 6 mg (has no administration in time range)   ondansetron injection 4 mg (has no administration in time range)   promethazine tablet 25 mg (has no administration in time range)   polyethylene glycol packet 17 g (has no administration in time range)   acetaminophen tablet 650 mg (has no administration in time range)   simethicone chewable tablet 80 mg (has no administration in time range)   aluminum-magnesium hydroxide-simethicone 200-200-20 mg/5 mL suspension 30 mL (has no administration in time range)   acetaminophen suppository 650 mg (has no administration in time range)   HYDROcodone-acetaminophen 5-325 mg per tablet 1 tablet (has no administration in time range)   morphine injection 2 mg (has no administration in time range)   naloxone 0.4 mg/mL injection 0.02 mg (has no administration in time range)   glucose chewable tablet 16 g (has no administration in time range)   glucose chewable tablet 24 g (has no administration in time range)   glucagon (human recombinant) injection 1 mg (has no administration in time range)   enoxaparin injection 40 mg (40 mg Subcutaneous Given 4/18/24 2319)   dextrose 10% bolus 125 mL 125 mL (has no administration in time range)   dextrose 10% bolus 250 mL 250 mL (has no administration in time range)   hydrALAZINE tablet 50 mg (has no administration in time range)   verapamiL CR tablet 240 mg (has no administration in time range)   metoprolol tartrate (LOPRESSOR) tablet 50 mg (has no administration in time range)   losartan tablet 100 mg (has no administration in time range)   hydroCHLOROthiazide tablet 25 mg (has no administration in time range)       ED Course as of 04/19/24 0449   Thu Apr 18, 2024   1313 Left message with sister Sherry to call ED. [LB]   1344 Specific Gravity, UA: 1.015 [LB]   1437 Note unable to visualize patient's previous EKGs secondary to issue with Muse. [LB]   1511 Discussing with patient recommendation for US of carotids and ECHO.   Secure chat with Hospital Medicine, Dr. Melinda Pena.   She is recommending observation. [LB]   1520 Patient's daughter at bedside.  Discussed possible increased risk of arrhythmia given cardiomegaly.   I did discuss with patient and daughter that they could be transferred to any facility of their choice provided the had cardiology services, cardiac monitoring, & a bed.  They are discussing preferred facility .  Requests: [facility].  Followed by Dr. Huang (cardiology).  RRC placed for BRG.  Family daughter aware that this process may take several hours. [LB]   1622 Patient updated.  Still awaiting to hear from [facility] regarding acceptance.  Patient is agreeable to go to [facility] as bed is available.  [LB]   1705 Patient's daughter Guerita aware [place] [facility]  [LB]      ED Course User Index  [LB] Kymberly Peck, DO     4:30 PM   All historical, clinical, radiographic, and laboratory findings were reviewed with the patient/family in detail.  I discussed the indications and treatment need: (Internal Medicine and Echocardiogram ) .    Patient/Family requests transfer to: [facility]    Patient/Family understands that [facility] does provide (Internal Medicine and Echocardiogram ) services.     Patient/family verbalized understanding.   All remaining questions and concerns were addressed at that time and the patient/family agrees to proceed accordingly.  Similarly all pertinent details of the encounter were discussed with Uma Pena MD at [facility] . Uma Pena MD agrees to accept the patient in transfer based on the needs/patient preferences outlined above.  [facility] , secondary to a need for ongoing Cardiac Monitoring and Neurochecks en route.  Risks: of transfer:    inclement weather, loss of vitals signs, permanent  "neurologic damage, MVC, loss of current lifestyle,  and/or death.  Benefits of transfer: Internal Medicine and Echocardiogram .  Patient and/or family agree and verbalize understanding.     Kymberly Peck, DO,  FACEP     MIPS Measures     Smoker? No     Hypertension: History of Hypertension: The patient has elevated blood pressure (higher than 120/80) while being treated in the ED but has a history of hypertension.    CT Avoidance for Uncomplicated Syncope [id number]    Intent: Avoid ordering CT of the Head for Uncomplicated syncope.  Justification: Provider should document the rationale when ordering CT for uncomplicated Syncope.    Rationale for ordering CT: Head pain    MIPS Measure #415:  Emergency Department Utilization of CT for Minor Blunt Head Trauma for Patients age 18 Years  and  Older.    Measure exclusions:  The patient has a ventricular shunt?   N  The patient has a brain tumor? N  The patient has multi-system trauma?  N  The patient is pregnant? N/A  The patient is taking anti platelet medication excluding aspirin?  No  Age 65 years and older?  Yes  Patient is 78 y.o.    A head CT was ordered? Yes:   The CT was ordered by the emergency provider?  Yes    A head CT was ordered by emergency care provider, and some the indications for ordering the head CT included:head pain in a patient 78 years old.       Medical Decision Making                 Medical Decision Making  Differential diagnosis:  Arrhythmia, hypoglycemia, symptomatic anemia, orthostatic hypotension    EKG:  No STEMI.  No arrhythmia noted.  PVC noted.  Labs:  Troponin 0.006 x 2, WBC nl, H/H 10.8/34.4 (10.4/31.6) CMP normal, urinalysis specific gravity 1.015 .  Imaging:  CT cervical spine:  No fracture, DJD.  Right shoulder negative exam.  CT head:  Age-related atrophy, chest x-ray:  Borderline cardiomegaly.  Orthostatics negative.    Patient was told she has a "enlarged heart".  Old records were reviewed, no record of echocardiogram.  " Patient has a borderline enlarged heart on chest x-ray.  BNP elevated at 614.  Patient is not deemed low risk according to the Howes syncope rules.  Therefore decision was made to place patient in hospital.    Amount and/or Complexity of Data Reviewed  Independent Historian: caregiver and EMS     Details: See HPI.  External Data Reviewed: labs.     Details: See ED course.  Labs: ordered. Decision-making details documented in ED Course.  Radiology: ordered and independent interpretation performed. Decision-making details documented in ED Course.  ECG/medicine tests: ordered and independent interpretation performed. Decision-making details documented in ED Course.  Discussion of management or test interpretation with external provider(s): Secure chat with Hospital Medicine.    Risk  Decision regarding hospitalization.        Coding    Prescription Management: I performed a review of the patient's current Rx medication list as input by nursing staff.    Current Discharge Medication List        CONTINUE these medications which have NOT CHANGED    Details   hydrALAZINE (APRESOLINE) 25 MG tablet Take 50 mg by mouth every 12 (twelve) hours.      losartan-hydrochlorothiazide 100-25 mg (HYZAAR) 100-25 mg per tablet Take 1 tablet by mouth once daily.  Qty: 30 tablet, Refills: 6    Associated Diagnoses: Unspecified essential hypertension      metoprolol tartrate (LOPRESSOR) 100 MG tablet Take 1 tablet (100 mg total) by mouth once daily.  Qty: 30 tablet, Refills: 6    Associated Diagnoses: Essential hypertension, benign      verapamil (CALAN-SR) 240 MG CR tablet Take 1 tablet (240 mg total) by mouth once daily.  Qty: 30 tablet, Refills: 6    Associated Diagnoses: Unspecified essential hypertension      amoxicillin (AMOXIL) 500 MG Tab Take by mouth.      estradioL (ESTRACE) 0.01 % (0.1 mg/gram) vaginal cream estradiol 0.01% (0.1 mg/gram) vaginal cream      LORazepam (ATIVAN) 0.5 MG tablet Take 0.5 tablets (0.25 mg total)  "by mouth nightly as needed (insomnia/anxiety).  Qty: 15 tablet, Refills: 0      nitrofurantoin, macrocrystal-monohydrate, (MACROBID) 100 MG capsule Take 100 mg by mouth.              Discussed case with:Hospital Medicine      Portions of this note may have been created with voice recognition software. Occasional "wrong-word" or "sound-a-like" substitutions may have occurred due to the inherent limitations of voice recognition software. Please, read the note carefully and recognize, using context, where substitutions have occurred.          Clinical Impression       ICD-10-CM ICD-9-CM   1. Near syncope  R55 780.2   2. Headache  R51.9 784.0   3. Right shoulder pain  M25.511 719.41   4. Chest pain  R07.9 786.50        Disposition        Disposition: Admit to telemetry  Patient condition: Stable             Kymberly Peck, DO  04/19/24 0452    "

## 2024-04-19 PROBLEM — R00.1 BRADYCARDIA: Status: ACTIVE | Noted: 2024-04-19

## 2024-04-19 PROBLEM — R55 SYNCOPE: Status: ACTIVE | Noted: 2024-04-19

## 2024-04-19 PROBLEM — R79.89 ELEVATED BRAIN NATRIURETIC PEPTIDE (BNP) LEVEL: Status: ACTIVE | Noted: 2024-04-19

## 2024-04-19 LAB
AORTIC ROOT ANNULUS: 3.21 CM
ASCENDING AORTA: 2.77 CM
AV INDEX (PROSTH): 0.62
AV MEAN GRADIENT: 6 MMHG
AV PEAK GRADIENT: 12 MMHG
AV REGURGITATION PRESSURE HALF TIME: 762.52 MS
AV VALVE AREA BY VELOCITY RATIO: 1.86 CM²
AV VALVE AREA: 1.92 CM²
AV VELOCITY RATIO: 0.6
BSA FOR ECHO PROCEDURE: 1.65 M2
CV ECHO LV RWT: 0.57 CM
DOP CALC AO PEAK VEL: 1.72 M/S
DOP CALC AO VTI: 38.1 CM
DOP CALC LVOT AREA: 3.1 CM2
DOP CALC LVOT DIAMETER: 1.98 CM
DOP CALC LVOT PEAK VEL: 1.04 M/S
DOP CALC LVOT STROKE VOLUME: 73.24 CM3
DOP CALC RVOT PEAK VEL: 0.84 M/S
DOP CALC RVOT VTI: 22.3 CM
DOP CALCLVOT PEAK VEL VTI: 23.8 CM
E WAVE DECELERATION TIME: 167.02 MSEC
E/A RATIO: 1.17
E/E' RATIO: 11.2 M/S
ECHO LV POSTERIOR WALL: 1.19 CM (ref 0.6–1.1)
EJECTION FRACTION: 55 %
FRACTIONAL SHORTENING: 28 % (ref 28–44)
INTERVENTRICULAR SEPTUM: 1.28 CM (ref 0.6–1.1)
IVC DIAMETER: 1.58 CM
IVRT: 74.22 MSEC
LA MAJOR: 5.11 CM
LA MINOR: 5.26 CM
LA WIDTH: 4 CM
LEFT ATRIUM SIZE: 3.25 CM
LEFT ATRIUM VOLUME INDEX: 35.1 ML/M2
LEFT ATRIUM VOLUME: 57.28 CM3
LEFT INTERNAL DIMENSION IN SYSTOLE: 2.99 CM (ref 2.1–4)
LEFT VENTRICLE DIASTOLIC VOLUME INDEX: 46.94 ML/M2
LEFT VENTRICLE DIASTOLIC VOLUME: 76.52 ML
LEFT VENTRICLE MASS INDEX: 112 G/M2
LEFT VENTRICLE SYSTOLIC VOLUME INDEX: 21.3 ML/M2
LEFT VENTRICLE SYSTOLIC VOLUME: 34.72 ML
LEFT VENTRICULAR INTERNAL DIMENSION IN DIASTOLE: 4.15 CM (ref 3.5–6)
LEFT VENTRICULAR MASS: 182.51 G
LV LATERAL E/E' RATIO: 9.33 M/S
LV SEPTAL E/E' RATIO: 14 M/S
LVOT MG: 2.73 MMHG
LVOT MV: 0.81 CM/S
MV PEAK A VEL: 0.72 M/S
MV PEAK E VEL: 0.84 M/S
MV STENOSIS PRESSURE HALF TIME: 48.43 MS
MV VALVE AREA P 1/2 METHOD: 4.54 CM2
OHS QRS DURATION: 112 MS
OHS QRS DURATION: 114 MS
OHS QTC CALCULATION: 427 MS
OHS QTC CALCULATION: 436 MS
PISA AR MAX VEL: 3.58 M/S
PISA MRMAX VEL: 4.51 M/S
PISA TR MAX VEL: 2.64 M/S
PV MEAN GRADIENT: 2 MMHG
PV PEAK GRADIENT: 3
RA MAJOR: 4.73 CM
RA PRESSURE ESTIMATED: 3 MMHG
RA WIDTH: 2.6 CM
RV TB RVSP: 6 MMHG
STJ: 3.3 CM
T4 FREE SERPL-MCNC: 0.79 NG/DL (ref 0.71–1.51)
TDI LATERAL: 0.09 M/S
TDI SEPTAL: 0.06 M/S
TDI: 0.08 M/S
TR MAX PG: 28 MMHG
TR MEAN GRADIENT: 24 MMHG
TRICUSPID ANNULAR PLANE SYSTOLIC EXCURSION: 2.4 CM
TV REST PULMONARY ARTERY PRESSURE: 31 MMHG
Z-SCORE OF LEFT VENTRICULAR DIMENSION IN END DIASTOLE: -1.03
Z-SCORE OF LEFT VENTRICULAR DIMENSION IN END SYSTOLE: 0.38

## 2024-04-19 PROCEDURE — 21400001 HC TELEMETRY ROOM

## 2024-04-19 PROCEDURE — 84439 ASSAY OF FREE THYROXINE: CPT | Performed by: NURSE PRACTITIONER

## 2024-04-19 PROCEDURE — 25000003 PHARM REV CODE 250: Performed by: NURSE PRACTITIONER

## 2024-04-19 PROCEDURE — 93010 ELECTROCARDIOGRAM REPORT: CPT | Mod: ,,, | Performed by: INTERNAL MEDICINE

## 2024-04-19 PROCEDURE — 99223 1ST HOSP IP/OBS HIGH 75: CPT | Mod: 25,,, | Performed by: INTERNAL MEDICINE

## 2024-04-19 PROCEDURE — 63600175 PHARM REV CODE 636 W HCPCS: Performed by: NURSE PRACTITIONER

## 2024-04-19 PROCEDURE — 93005 ELECTROCARDIOGRAM TRACING: CPT

## 2024-04-19 PROCEDURE — 36415 COLL VENOUS BLD VENIPUNCTURE: CPT | Performed by: NURSE PRACTITIONER

## 2024-04-19 RX ORDER — METOPROLOL TARTRATE 50 MG/1
50 TABLET ORAL 2 TIMES DAILY
Status: DISCONTINUED | OUTPATIENT
Start: 2024-04-19 | End: 2024-04-19

## 2024-04-19 RX ORDER — HYDROCHLOROTHIAZIDE 25 MG/1
25 TABLET ORAL DAILY
Status: DISCONTINUED | OUTPATIENT
Start: 2024-04-19 | End: 2024-04-20 | Stop reason: HOSPADM

## 2024-04-19 RX ORDER — LOSARTAN POTASSIUM 50 MG/1
100 TABLET ORAL DAILY
Status: DISCONTINUED | OUTPATIENT
Start: 2024-04-19 | End: 2024-04-20 | Stop reason: HOSPADM

## 2024-04-19 RX ORDER — HYDRALAZINE HYDROCHLORIDE 50 MG/1
50 TABLET, FILM COATED ORAL EVERY 8 HOURS
Status: DISCONTINUED | OUTPATIENT
Start: 2024-04-19 | End: 2024-04-20

## 2024-04-19 RX ORDER — HYDRALAZINE HYDROCHLORIDE 50 MG/1
50 TABLET, FILM COATED ORAL EVERY 12 HOURS
Status: DISCONTINUED | OUTPATIENT
Start: 2024-04-19 | End: 2024-04-19

## 2024-04-19 RX ORDER — VERAPAMIL HYDROCHLORIDE 240 MG/1
240 TABLET, FILM COATED, EXTENDED RELEASE ORAL DAILY
Status: DISCONTINUED | OUTPATIENT
Start: 2024-04-19 | End: 2024-04-19

## 2024-04-19 RX ADMIN — ENOXAPARIN SODIUM 40 MG: 40 INJECTION SUBCUTANEOUS at 05:04

## 2024-04-19 RX ADMIN — HYDROCHLOROTHIAZIDE 25 MG: 25 TABLET ORAL at 07:04

## 2024-04-19 RX ADMIN — HYDRALAZINE HYDROCHLORIDE 50 MG: 50 TABLET ORAL at 09:04

## 2024-04-19 RX ADMIN — METOPROLOL TARTRATE 50 MG: 50 TABLET, FILM COATED ORAL at 07:04

## 2024-04-19 RX ADMIN — VERAPAMIL HYDROCHLORIDE 240 MG: 240 TABLET ORAL at 07:04

## 2024-04-19 RX ADMIN — HYDRALAZINE HYDROCHLORIDE 50 MG: 50 TABLET ORAL at 07:04

## 2024-04-19 RX ADMIN — LOSARTAN POTASSIUM 100 MG: 50 TABLET, FILM COATED ORAL at 07:04

## 2024-04-19 RX ADMIN — HYDRALAZINE HYDROCHLORIDE 50 MG: 50 TABLET ORAL at 02:04

## 2024-04-19 NOTE — HOSPITAL COURSE
The patient is a 79 yo female with HTN, HLD, Anemia, Anxiety who was placed in observation after having a syncopal episode with fall in her kitchen. +right shoulder pain after fall. Full ROM of right shoulder noted. Mildly TTP. CT head showed chronic appearing right thalamic lacunar infarct, nothing acute. Right shoulder xray was normal. CT-C spine was unremarkable. Carotid u/s showed no significant stenosis. Echo showed normal LVEF, concentric hypertrophy, mild AS. Serial troponin normal. BP elevated - as high as 186/100. Renal artery U/S showed no stenosis. Cardiology consulted. Bradycardia noted-normal sinus bradycardia with no AV block. BP meds Lopressor and Verapamil were held. HR improved. Pt was continued on Losartan/HCTZ and Hydralazine. BP improved but remained mildly elevated. Hydralazine increased to 75mg BID and Amlodipine added. BP stabilized. Cardiology recommended OP Holter monitor ([employer]ital connect). PT/OT evalauted pt. Pt was able to ambualte and sit up without dizziness/syncope. Pt was counseled to f/u with PCP for BP recheck and with her Cardiologist for syncope, bradycardia, and OP Holter monitor.   The patient was seen and examined today and determined to be stable for discharge.

## 2024-04-19 NOTE — HPI
Janet Hamilton is a 78 y.o. female with a PMH  has a past medical history of Anemia, Anxiety, Bronchitis, Enlarged heart, Hyperlipidemia, and Hypertension.  Presents as a transfer from our [facility] facility for further evaluation of syncopal episode she had prior to arrival at outside facility.  Patient reports she just finished eating breakfast when she went to put her plate on a counter.  Patient reports she felt lightheaded immediately had loss of consciousness.  Fall was witnessed by her sister.  Patient did hit her head in complains of headache and right shoulder pain following the fall.  Denies previous syncopal event.  Patient is followed by cardiologist, Dr. Huang.  Evaluated few months ago for routine checkup and states that everything was fine.  Denied any chest pain, palpitations, shortness of breath, dyspnea exertion, recent illnesses/sick contacts, or any other symptoms at this time.     ER workup revealed a  BNP level of 614.  Troponin x2.  UA negative.  Chest x-ray negative.  Cervical spine CT negative.  When shoulder x-ray negative for acute findings.  CT of the head revealed:[There are advanced white matter changes consistent with age-related small vessel ischemic degeneration.  Chronic appearing right thalamic lacunar infarct].  Hospital Medicine consulted to admit patient for further syncopal workup.  Patient in agreement with treatment plan.  Patient admitted under observation status.    PCP: Omid Morris

## 2024-04-19 NOTE — ASSESSMENT & PLAN NOTE
-Hold Verapamil and BB for now given bradycardia  -Monitor BP trend  -Would transition to amlodipine tmw if needed and resume low dose BB

## 2024-04-19 NOTE — PLAN OF CARE
[facility] - Telemetry (Hospital)  Initial Discharge Assessment       Primary Care Provider: Omid Morris MD    Admission Diagnosis: Right shoulder pain [M25.511]  Near syncope [R55]  Headache [R51.9]    Admission Date: 4/18/2024  Expected Discharge Date:     Transition of Care Barriers: None    Payor: [employer] MANAGED MEDICARE / Plan: [employer] MEDICARE PPO / Product Type: Medicare Advantage /     Extended Emergency Contact Information  Primary Emergency Contact: Guerita Harvey  Mobile Phone: [phone]  Relation: Daughter  Preferred language: English   needed? No  Secondary Emergency Contact: Wally Garcia  Mobile Phone: [phone]  Relation: Sister  Preferred language: English   needed? No    Discharge Plan A: Home with family         [facility] [place] - [address]  [address] [address]  Phone: [phone] Fax: [phone]      Initial Assessment (most recent)       Adult Discharge Assessment - 04/19/24 0920          Discharge Assessment    Assessment Type Discharge Planning Assessment     Confirmed/corrected address, phone number and insurance Yes     Confirmed Demographics Correct on Facesheet     Source of Information patient     When was your last doctors appointment? 03/04/24   Mariana Pino DPM - Podiatry    Communicated SABI with patient/caregiver Date not available/Unable to determine     Reason For Admission Syncope     People in Home sibling(s)     Facility Arrived From: home/ [facility] ED     Do you expect to return to your current living situation? Yes     Do you have help at home or someone to help you manage your care at home? Yes     Who are your caregiver(s) and their phone number(s)? Guerita Harvey - daughter     Prior to hospitilization cognitive status: Alert/Oriented     Current cognitive status: Alert/Oriented     Walking or Climbing Stairs Difficulty no     Dressing/Bathing Difficulty yes     Dressing/Bathing bathing  difficulty, requires equipment     Dressing/Bathing Management shower chair     Home Accessibility wheelchair accessible     Equipment Currently Used at Home shower chair;rollator     Readmission within 30 days? No     Patient currently being followed by outpatient case management? No     Do you currently have service(s) that help you manage your care at home? No     Do you take prescription medications? Yes     Do you have prescription coverage? Yes     Coverage [employer] Managed Medicare     Do you have any problems affording any of your prescribed medications? No     Is the patient taking medications as prescribed? yes     Who is going to help you get home at discharge? Guerita Harvey - daughter     How do you get to doctors appointments? car, drives self;family or friend will provide     Are you on dialysis? No     Do you take coumadin? No     Discharge Plan A Home with family     DME Needed Upon Discharge  none     Discharge Plan discussed with: Patient     Transition of Care Barriers None        Housing Stability    In the last 12 months, was there a time when you were not able to pay the mortgage or rent on time? No     At any time in the past 12 months, were you homeless or living in a shelter (including now)? No        Transportation Needs    In the past 12 months, has lack of transportation kept you from medical appointments or from getting medications? No     In the past 12 months, has lack of transportation kept you from meetings, work, or from getting things needed for daily living? No        Food Insecurity    Within the past 12 months, you worried that your food would run out before you got the money to buy more. Never true     Within the past 12 months, the food you bought just didn't last and you didn't have money to get more. Never true                   Anticipated DC dispo: home with family  Prior Level of Function: independent with ADLs  People in home: Wally Garcia - sister    Comments:  JOHANNA met  with patient at bedside to introduce role and discuss discharge planning. Elly's daughter, Guerita, will be help at home and can provide transport at time of discharge. Confirmed demographics, insurance, and emergency contacts. SW updated Patient's whiteboard with contact information and anticipated DC plan. CM discharge needs depends on hospital progress. SW will continue following to assist with other needs.

## 2024-04-19 NOTE — ASSESSMENT & PLAN NOTE
Patient presented following syncope episode resulting in ground-level fall with regain of consciousness approximately few seconds later. Patient reported/denied endorsing presyncopal symptoms including (dizziness). Diffrential diagnosis include reflux syncope (vasovagal) vs orthostatic/postural likely secondary to volume depletion from dehydration in setting of medications as patient currently on diuretics, BB, CCB, ACE/ARB, vs cardiac arrhythmias/structural disease vs cerebrovascular disease (seizures/stroke).  Plan:  -Orthostatics  -EKG  -Echo  -Telemetry    -Carotid dopplers

## 2024-04-19 NOTE — PLAN OF CARE
A228/A228 MADI  Janet Hamilton is a 78 y.o.female admitted on 4/18/2024 for Syncope   Code Status: Full Code MRN: [id number]   Review of patient's allergies indicates:  No Known Allergies  Past Medical History:   Diagnosis Date    Anemia     Anxiety     Bronchitis     Enlarged heart     Hyperlipidemia     Hypertension       PRN meds    acetaminophen, 650 mg, Q4H PRN  acetaminophen, 650 mg, Q8H PRN  aluminum-magnesium hydroxide-simethicone, 30 mL, QID PRN  dextrose 10%, 12.5 g, PRN  dextrose 10%, 25 g, PRN  glucagon (human recombinant), 1 mg, PRN  glucose, 16 g, PRN  glucose, 24 g, PRN  HYDROcodone-acetaminophen, 1 tablet, Q6H PRN  melatonin, 6 mg, Nightly PRN  morphine, 2 mg, Q4H PRN  naloxone, 0.02 mg, PRN  ondansetron, 4 mg, Q8H PRN  polyethylene glycol, 17 g, Daily PRN  promethazine, 25 mg, Q6H PRN  simethicone, 1 tablet, QID PRN  sodium chloride 0.9%, 3 mL, Q12H PRN      Patient voiced no concerns throughout duration of shift, Patient experienced hypertensive episode, Dr. Campbell notified, PRNs ordered and administered. Patient tolerated well. Cardiac monitoring continues, call light within reach. Chart check completed. Will continue plan of care.      Orientation: oriented x 4  Dixon Coma Scale Score: 15     Lead Monitored: Lead II Rhythm: sinus bradycardia    Cardiac/Telemetry Box Number: [id number]  VTE Required Core Measure: (SCDs) Sequential compression device initiated/maintained Last Bowel Movement: 04/17/24  Diet Cardiac  Voiding Characteristics: external catheter  Sandip Score: 19  Fall Risk Score: 16  Accucheck []   Freq?      Lines/Drains/Airways       Peripheral Intravenous Line  Duration                  Peripheral IV - Single Lumen 04/18/24 1400 22 G Posterior;Right Hand <1 day         Peripheral IV - Single Lumen 04/18/24 1623 20 G Left Antecubital <1 day

## 2024-04-19 NOTE — HPI
Ms. Hamilton is a 78 year old male patient whose current medical conditions include anemia, anxiety, hyperlipidemia, and HTN who presented to [facility] ED yesterday s/p syncopal episode. Patient reported she had just finished eating breakfast and was ambulating to the sink when she began to feel lightheaded and then subsequently lost consciousness. Episode was witnessed by her sister. Patient did hit her heat and shoulder area on the way down. She denied any associated SOB, eddie chest pain, fever, chills, nausea, or vomiting prior to the event. Initial workup in ED revealed BNP of 614. CT of head showed small vessel ischemic degeneration and chronic appearing right thalamic infarct and patient was subsequently transferred to [facility] for further workup and admission. Cardiology consulted to assist with management. Patient seen and examined, resting in bed. Feels ok. Slightly weak. Remains CP free. No SOB. No recent changes in medication. No recent illness. She is compliant with her home meds, on BB and Verapamil as OP. No history of CAD/MI. Sees outside cardiologist, Dr. Huang for routine check-ups. Chart reviewed. Troponin x 2 negative. HR low this AM, EKG with SR with LAFB, no acute ischemic changes. TTE pending. Orthostatic vitals negative.

## 2024-04-19 NOTE — CONSULTS
[facility] - Telemetry ([facility])  Cardiology  Consult Note    Patient Name: Janet Hamilton  MRN: [id number]  Admission Date: 4/18/2024  Hospital Length of Stay: 0 days  Code Status: Full Code   Attending Provider: Belia Campbell MD   Consulting Provider: Shoshana Tolliver PA-C  Primary Care Physician: Omid Morris MD  Principal Problem:Syncope    Patient information was obtained from patient, past medical records, and ER records.     Inpatient consult to Cardiology  Consult performed by: Shoshana Tolliver PA-C  Consult ordered by: Jerica Quick NP        Subjective:     Chief Complaint: Syncope    HPI:   Ms. Hamilton is a 78 year old male patient whose current medical conditions include anemia, anxiety, hyperlipidemia, and HTN who presented to [facility] ED yesterday s/p syncopal episode. Patient reported she had just finished eating breakfast and was ambulating to the sink when she began to feel lightheaded and then subsequently lost consciousness. Episode was witnessed by her sister. Patient did hit her heat and shoulder area on the way down. She denied any associated SOB, eddie chest pain, fever, chills, nausea, or vomiting prior to the event. Initial workup in ED revealed BNP of 614. CT of head showed small vessel ischemic degeneration and chronic appearing right thalamic infarct and patient was subsequently transferred to [facility] for further workup and admission. Cardiology consulted to assist with management. Patient seen and examined, resting in bed. Feels ok. Slightly weak. Remains CP free. No SOB. No recent changes in medication. No recent illness. She is compliant with her home meds, on BB and Verapamil as OP. No history of CAD/MI. Sees outside cardiologist, Dr. Huang for routine check-ups. Chart reviewed. Troponin x 2 negative. HR low this AM, EKG with SR with LAFB, no acute ischemic changes. TTE pending. Orthostatic vitals negative.    Past Medical History:   Diagnosis Date    Anemia      Anxiety     Bronchitis     Enlarged heart     Hyperlipidemia     Hypertension        Past Surgical History:   Procedure Laterality Date    TUBAL LIGATION      VAGINAL PROLAPSE REPAIR  09/10/2013    Uterus prolapse/ [facility]       Review of patient's allergies indicates:  No Known Allergies    Current Facility-Administered Medications   Medication Dose Route Frequency Provider Last Rate Last Admin    acetaminophen suppository 650 mg  650 mg Rectal Q4H PRN Kavon Maxwell NP        acetaminophen tablet 650 mg  650 mg Oral Q8H PRN Kavon Maxwell NP        aluminum-magnesium hydroxide-simethicone 200-200-20 mg/5 mL suspension 30 mL  30 mL Oral QID PRN Kavon Maxwell NP        dextrose 10% bolus 125 mL 125 mL  12.5 g Intravenous PRN Kavon Maxwell NP        dextrose 10% bolus 250 mL 250 mL  25 g Intravenous PRN Kavon Maxwell NP        enoxaparin injection 40 mg  40 mg Subcutaneous Daily Kavon Maxwell NP   40 mg at 04/18/24 2319    glucagon (human recombinant) injection 1 mg  1 mg Intramuscular PRN Kaovn Maxwell NP        glucose chewable tablet 16 g  16 g Oral PRN Kavon Maxwell NP        glucose chewable tablet 24 g  24 g Oral PRN Kavon Maxwell NP        hydrALAZINE tablet 50 mg  50 mg Oral Q8H Jerica Quick NP        hydroCHLOROthiazide tablet 25 mg  25 mg Oral Daily Kavon Maxwell NP   25 mg at 04/19/24 0751    HYDROcodone-acetaminophen 5-325 mg per tablet 1 tablet  1 tablet Oral Q6H PRN Kavon Maxwell NP        losartan tablet 100 mg  100 mg Oral Daily Kavon Maxwell NP   100 mg at 04/19/24 0751    melatonin tablet 6 mg  6 mg Oral Nightly PRN Kavon Maxwell NP        morphine injection 2 mg  2 mg Intravenous Q4H PRN Kavon Maxwell NP        naloxone 0.4 mg/mL injection 0.02 mg  0.02 mg Intravenous PRN Kavon Maxwell NP        ondansetron injection 4 mg  4 mg Intravenous Q8H PRN Kavon Maxwell NP        polyethylene glycol packet  17 g  17 g Oral Daily PRN Kavon Maxwell, SHAE        promethazine tablet 25 mg  25 mg Oral Q6H PRN Kavon Maxwell NP        simethicone chewable tablet 80 mg  1 tablet Oral QID PRN Kavon Maxwell NP        sodium chloride 0.9% flush 3 mL  3 mL Intravenous Q12H PRN Kavon Maxwell NP         Family History       Problem Relation (Age of Onset)    Stroke Brother          Tobacco Use    Smoking status: Never    Smokeless tobacco: Never   Substance and Sexual Activity    Alcohol use: Yes     Comment: occasional     Drug use: No    Sexual activity: Not Currently     Review of Systems   Constitutional: Positive for malaise/fatigue.   HENT: Negative.     Eyes: Negative.    Cardiovascular:  Positive for syncope.   Respiratory: Negative.     Endocrine: Negative.    Hematologic/Lymphatic: Negative.    Skin: Negative.    Musculoskeletal:  Positive for arthritis and joint pain.   Gastrointestinal: Negative.    Genitourinary: Negative.    Psychiatric/Behavioral: Negative.     Allergic/Immunologic: Negative.      Objective:     Vital Signs (Most Recent):  Temp: 97.1 °F (36.2 °C) (04/19/24 0744)  Pulse: (!) 54 (04/19/24 1202)  Resp: 17 (04/19/24 0744)  BP: (!) 156/68 (04/19/24 1202)  SpO2: 96 % (04/19/24 1202) Vital Signs (24h Range):  Temp:  [97.1 °F (36.2 °C)-98.1 °F (36.7 °C)] 97.1 °F (36.2 °C)  Pulse:  [48-69] 54  Resp:  [13-21] 17  SpO2:  [96 %-100 %] 96 %  BP: (127-198)/() 156/68     Weight: 62.6 kg (138 lb)  Body mass index is 25.24 kg/m².    SpO2: 96 %         Intake/Output Summary (Last 24 hours) at 4/19/2024 1230  Last data filed at 4/19/2024 0852  Gross per 24 hour   Intake 0 ml   Output 200 ml   Net -200 ml       Lines/Drains/Airways       Peripheral Intravenous Line  Duration                  Peripheral IV - Single Lumen 04/18/24 1400 22 G Posterior;Right Hand <1 day         Peripheral IV - Single Lumen 04/18/24 1623 20 G Left Antecubital <1 day                     Physical Exam  Vitals and  nursing note reviewed.   Constitutional:       General: She is not in acute distress.     Appearance: She is well-developed. She is not diaphoretic.   HENT:      Head: Normocephalic and atraumatic.   Eyes:      General:         Right eye: No discharge.         Left eye: No discharge.      Pupils: Pupils are equal, round, and reactive to light.   Cardiovascular:      Rate and Rhythm: Normal rate and regular rhythm.      Heart sounds: Normal heart sounds, S1 normal and S2 normal. No murmur heard.  Pulmonary:      Effort: Pulmonary effort is normal. No respiratory distress.      Breath sounds: Normal breath sounds. No wheezing.   Abdominal:      General: There is no distension.   Musculoskeletal:      Right lower leg: No edema.      Left lower leg: No edema.   Skin:     General: Skin is warm and dry.      Findings: No erythema.   Neurological:      General: No focal deficit present.      Mental Status: She is alert and oriented to person, place, and time.   Psychiatric:         Mood and Affect: Mood normal.         Behavior: Behavior normal.          Significant Labs: CMP   Recent Labs   Lab 04/18/24  1407      K 4.8      CO2 24   GLU 82   BUN 21   CREATININE 1.0   CALCIUM 9.5   PROT 7.7   ALBUMIN 3.9   BILITOT 0.3   ALKPHOS 39*   AST 22   ALT 14   ANIONGAP 11   , CBC   Recent Labs   Lab 04/18/24  1407   WBC 5.05   HGB 10.8*   HCT 34.4*      , Troponin   Recent Labs   Lab 04/18/24  1407 04/18/24  1621   TROPONINI <0.006 <0.006   , and All pertinent lab results from the last 24 hours have been reviewed.    Significant Imaging: Echocardiogram: Transthoracic echo (TTE) complete ([facility] Only):   Results for orders placed or performed during the hospital encounter of 04/18/24   Echo   Result Value Ref Range    BSA 1.65 m2    LVOT stroke volume 73.24 cm3    LVIDd 4.15 3.5 - 6.0 cm    LV Systolic Volume 34.72 mL    LV Systolic Volume Index 21.3 mL/m2    LVIDs 2.99 2.1 - 4.0 cm    LV Diastolic Volume 76.52 mL     LV Diastolic Volume Index 46.94 mL/m2    IVS 1.28 (A) 0.6 - 1.1 cm    LVOT diameter 1.98 cm    LVOT area 3.1 cm2    FS 28 28 - 44 %    Left Ventricle Relative Wall Thickness 0.57 cm    Posterior Wall 1.19 (A) 0.6 - 1.1 cm    LV mass 182.51 g    LV Mass Index 112 g/m2    MV Peak E Slim 0.84 m/s    TDI LATERAL 0.09 m/s    TDI SEPTAL 0.06 m/s    E/E' ratio 11.20 m/s    MV Peak A Slim 0.72 m/s    TR Max Slim 2.64 m/s    E/A ratio 1.17     IVRT 74.22 msec    E wave deceleration time 167.02 msec    LV SEPTAL E/E' RATIO 14.00 m/s    LV LATERAL E/E' RATIO 9.33 m/s    LVOT peak slim 1.04 m/s    Left Ventricular Outflow Tract Mean Velocity 0.81 cm/s    Left Ventricular Outflow Tract Mean Gradient 2.73 mmHg    RVOT peak VTI 22.3 cm    TAPSE 2.40 cm    LA size 3.25 cm    Left Atrium Minor Axis 5.26 cm    Left Atrium Major Axis 5.11 cm    RA Major Axis 4.73 cm    AV regurgitation pressure 1/2 time [id number] ms    AR Max Slim 3.58 m/s    AV mean gradient 6 mmHg    AV peak gradient 12 mmHg    Ao peak slim 1.72 m/s    Ao VTI 38.10 cm    LVOT peak VTI 23.80 cm    AV valve area 1.92 cm²    AV Velocity Ratio 0.60     AV index (prosthetic) 0.62     SHARIF by Velocity Ratio 1.86 cm²    Mr max slim 4.51 m/s    MV stenosis pressure 1/2 time 48.43 ms    MV valve area p 1/2 method 4.54 cm2    TV mean gradient 24 mmHg    Triscuspid Valve Regurgitation Peak Gradient 28 mmHg    PV mean gradient 2 mmHg    PV peak gradient 3     RVOT peak slim 0.84 m/s    Ao root annulus 3.21 cm    STJ 3.30 cm    Ascending aorta 2.77 cm    IVC diameter 1.58 cm    Mean e' 0.08 m/s    ZLVIDS 0.38     ZLVIDD -1.03     LA Volume Index 35.1 mL/m2    LA volume 57.28 cm3    LA WIDTH 4.0 cm    RA Width 2.6 cm   , EKG: Reviewed, and X-Ray: CXR: X-Ray Chest 1 View (CXR): No results found for this visit on 04/18/24. and X-Ray Chest PA and Lateral (CXR): No results found for this visit on 04/18/24.  Assessment and Plan:   Patient who presents s/p syncopal episode. Unclear  etiology. Bradycardic this AM, AV carine agents held. Monitor overnight for arrhythmias. Recommend switching to amlodipine for BP control. Would resume low dose BB if tolerated. TTE pending.     * Syncope  -Patient presents s/p syncopal episode  -Unclear etiology  -Troponin x 2 negative  -Needs arrhythmia ruled out, continue telemetry monitor, needs OP Vital Connect  -Orthostatic vitals negative  -Monitor BP trend  -TTE pending      Bradycardia  -BB and Verapamil held   -Monitor HR trend    Elevated brain natriuretic peptide (BNP) level  -Compensated    Essential hypertension, benign  -Hold Verapamil and BB for now given bradycardia  -Monitor BP trend  -Would transition to amlodipine tmw if needed and resume low dose BB        VTE Risk Mitigation (From admission, onward)           Ordered     enoxaparin injection 40 mg  Daily         04/18/24 2205     IP VTE HIGH RISK PATIENT  Once         04/18/24 2205     Place sequential compression device  Until discontinued         04/18/24 2205                    Thank you for your consult. I will follow-up with patient. Please contact us if you have any additional questions.    Shoshana Tolliver PA-C  Cardiology   [facility] - Telemetry ([facility])

## 2024-04-19 NOTE — SUBJECTIVE & OBJECTIVE
Past Medical History:   Diagnosis Date    Anemia     Anxiety     Bronchitis     Enlarged heart     Hyperlipidemia     Hypertension        Past Surgical History:   Procedure Laterality Date    TUBAL LIGATION      VAGINAL PROLAPSE REPAIR  09/10/2013    Uterus prolapse/ [facility]       Review of patient's allergies indicates:  No Known Allergies    Current Facility-Administered Medications   Medication Dose Route Frequency Provider Last Rate Last Admin    acetaminophen suppository 650 mg  650 mg Rectal Q4H PRN Kavon Maxwell NP        acetaminophen tablet 650 mg  650 mg Oral Q8H PRN Kavon Maxwell NP        aluminum-magnesium hydroxide-simethicone 200-200-20 mg/5 mL suspension 30 mL  30 mL Oral QID PRN Kavon Maxwell NP        dextrose 10% bolus 125 mL 125 mL  12.5 g Intravenous PRN Kavon Maxwell NP        dextrose 10% bolus 250 mL 250 mL  25 g Intravenous PRN Kavon Maxwell NP        enoxaparin injection 40 mg  40 mg Subcutaneous Daily Kavon Maxwell NP   40 mg at 04/18/24 2319    glucagon (human recombinant) injection 1 mg  1 mg Intramuscular PRN Kavon Maxwell NP        glucose chewable tablet 16 g  16 g Oral PRN Kavon Maxwell NP        glucose chewable tablet 24 g  24 g Oral PRN Kavon Maxwell NP        hydrALAZINE tablet 50 mg  50 mg Oral Q12H Kavon Maxwell NP        hydroCHLOROthiazide tablet 25 mg  25 mg Oral Daily Kavon Maxwell NP        HYDROcodone-acetaminophen 5-325 mg per tablet 1 tablet  1 tablet Oral Q6H PRN Kavon Maxwell NP        losartan tablet 100 mg  100 mg Oral Daily Kavon Maxwell NP        melatonin tablet 6 mg  6 mg Oral Nightly PRN Kavon Maxwell NP        metoprolol tartrate (LOPRESSOR) tablet 50 mg  50 mg Oral BID Kavon Maxwell NP        morphine injection 2 mg  2 mg Intravenous Q4H PRN Kavon Maxwell NP        naloxone 0.4 mg/mL injection 0.02 mg  0.02 mg Intravenous PRN Kavon Maxwell NP         ondansetron injection 4 mg  4 mg Intravenous Q8H PRN Kavon Maxwell NP        polyethylene glycol packet 17 g  17 g Oral Daily PRN Kavon Maxwell NP        promethazine tablet 25 mg  25 mg Oral Q6H PRN Kavon Maxwell NP        simethicone chewable tablet 80 mg  1 tablet Oral QID PRN Kavon Maxwell NP        sodium chloride 0.9% flush 3 mL  3 mL Intravenous Q12H PRN Kavon Maxwell NP        verapamiL CR tablet 240 mg  240 mg Oral Daily Kavon Maxwell NP         Family History       Problem Relation (Age of Onset)    Stroke Brother          Tobacco Use    Smoking status: Never    Smokeless tobacco: Never   Substance and Sexual Activity    Alcohol use: Yes     Comment: occasional     Drug use: No    Sexual activity: Not Currently     Review of Systems   Gastrointestinal:  Negative for diarrhea, nausea and vomiting.   Musculoskeletal:  Negative for arthralgias, back pain, myalgias, neck pain and neck stiffness.   Neurological:  Positive for dizziness, syncope, light-headedness and headaches. Negative for weakness.   All other systems reviewed and are negative.    Objective:     Vital Signs (Most Recent):  Temp: 97.1 °F (36.2 °C) (04/18/24 2203)  Pulse: (!) 59 (04/18/24 2212)  Resp: 17 (04/18/24 2203)  BP: (!) 170/73 (04/18/24 2203)  SpO2: 99 % (04/18/24 2203) Vital Signs (24h Range):  Temp:  [97.1 °F (36.2 °C)-97.7 °F (36.5 °C)] 97.1 °F (36.2 °C)  Pulse:  [49-69] 59  Resp:  [13-21] 17  SpO2:  [96 %-100 %] 99 %  BP: (127-173)/(60-81) 170/73     Weight: 62.7 kg (138 lb 3.7 oz)  Body mass index is 25.28 kg/m².     Physical Exam  Vitals and nursing note reviewed.   Constitutional:       General: She is awake. She is not in acute distress.     Appearance: Normal appearance. She is well-developed and well-groomed. She is not ill-appearing, toxic-appearing or diaphoretic.   HENT:      Head: Normocephalic and atraumatic.   Eyes:      Extraocular Movements: Extraocular movements intact.       Conjunctiva/sclera: Conjunctivae normal.   Cardiovascular:      Rate and Rhythm: Normal rate and regular rhythm.      Heart sounds: Normal heart sounds. No murmur heard.  Pulmonary:      Effort: Pulmonary effort is normal.      Breath sounds: Normal breath sounds.   Abdominal:      General: Bowel sounds are normal.      Palpations: Abdomen is soft.      Tenderness: There is no abdominal tenderness.   Musculoskeletal:      Cervical back: Normal range of motion and neck supple.      Comments: 5/5 strength throughout   Skin:     General: Skin is warm and dry.      Capillary Refill: Capillary refill takes less than 2 seconds.   Neurological:      General: No focal deficit present.      Mental Status: She is alert and oriented to person, place, and time. Mental status is at baseline.      GCS: GCS eye subscore is 4. GCS verbal subscore is 5. GCS motor subscore is 6.      Cranial Nerves: Cranial nerves 2-12 are intact.      Sensory: Sensation is intact.      Motor: Motor function is intact.   Psychiatric:         Mood and Affect: Mood normal.         Speech: Speech normal.         Behavior: Behavior normal. Behavior is cooperative.              LABS:  Recent Results (from the past 24 hour(s))   POCT glucose    Collection Time: 04/18/24 12:23 PM   Result Value Ref Range    POCT Glucose 104 70 - 110 mg/dL   Urinalysis, Reflex to Urine Culture Urine, Clean Catch    Collection Time: 04/18/24  1:25 PM    Specimen: Urine   Result Value Ref Range    Specimen UA Urine, Clean Catch     Color, UA Yellow Yellow, Straw, [place]    Appearance, UA Clear Clear    pH, UA 7.0 5.0 - 8.0    Specific Gravity, UA 1.015 1.005 - 1.030    Protein, UA Negative Negative    Glucose, UA Negative Negative    Ketones, UA Negative Negative    Bilirubin (UA) Negative Negative    Occult Blood UA Negative Negative    Nitrite, UA Negative Negative    Urobilinogen, UA Negative <2.0 EU/dL    Leukocytes, UA Negative Negative   CBC auto differential     Collection Time: 04/18/24  2:07 PM   Result Value Ref Range    WBC 5.05 3.90 - 12.70 K/uL    RBC 3.87 (L) 4.00 - 5.40 M/uL    Hemoglobin 10.8 (L) 12.0 - 16.0 g/dL    Hematocrit 34.4 (L) 37.0 - 48.5 %    MCV 89 82 - 98 fL    MCH 27.9 27.0 - 31.0 pg    MCHC 31.4 (L) 32.0 - 36.0 g/dL    RDW 17.6 (H) 11.5 - 14.5 %    Platelets 192 150 - 450 K/uL    MPV 11.0 9.2 - 12.9 fL    Immature Granulocytes 0.2 0.0 - 0.5 %    Gran # (ANC) 2.8 1.8 - 7.7 K/uL    Immature Grans (Abs) 0.01 0.00 - 0.04 K/uL    Lymph # 1.3 1.0 - 4.8 K/uL    Mono # 0.9 0.3 - 1.0 K/uL    Eos # 0.1 0.0 - 0.5 K/uL    Baso # 0.05 0.00 - 0.20 K/uL    nRBC 0 0 /100 WBC    Gran % 54.9 38.0 - 73.0 %    Lymph % 25.9 18.0 - 48.0 %    Mono % 17.0 (H) 4.0 - 15.0 %    Eosinophil % 1.0 0.0 - 8.0 %    Basophil % 1.0 0.0 - 1.9 %    Differential Method Automated    Comprehensive metabolic panel    Collection Time: 04/18/24  2:07 PM   Result Value Ref Range    Sodium 141 136 - 145 mmol/L    Potassium 4.8 3.5 - 5.1 mmol/L    Chloride 106 95 - 110 mmol/L    CO2 24 23 - 29 mmol/L    Glucose 82 70 - 110 mg/dL    BUN 21 8 - 23 mg/dL    Creatinine 1.0 0.5 - 1.4 mg/dL    Calcium 9.5 8.7 - 10.5 mg/dL    Total Protein 7.7 6.0 - 8.4 g/dL    Albumin 3.9 3.5 - 5.2 g/dL    Total Bilirubin 0.3 0.1 - 1.0 mg/dL    Alkaline Phosphatase 39 (L) 55 - 135 U/L    AST 22 10 - 40 U/L    ALT 14 10 - 44 U/L    eGFR 57.3 (A) >60 mL/min/1.73 m^2    Anion Gap 11 8 - 16 mmol/L   Troponin I #1    Collection Time: 04/18/24  2:07 PM   Result Value Ref Range    Troponin I <0.006 0.000 - 0.026 ng/mL   BNP    Collection Time: 04/18/24  2:07 PM   Result Value Ref Range     (H) 0 - 99 pg/mL   Troponin I #2    Collection Time: 04/18/24  4:21 PM   Result Value Ref Range    Troponin I <0.006 0.000 - 0.026 ng/mL       RADIOLOGY  CT Cervical Spine Without Contrast    Result Date: 4/18/2024  EXAMINATION: CT CERVICAL SPINE WITHOUT CONTRAST CLINICAL HISTORY: Neck trauma (Age >= 65y); TECHNIQUE: Thin section  axial images were acquired. Reformatted images were generated in the sagittal and coronal planes. COMPARISON: None FINDINGS: No fracture, precervical soft tissue swelling, or subluxation identified. No CT evidence of central canal stenosis or traumatic disc herniation.  Multilevel degenerative     Unremarkable exam. All CT scans at this facility use dose modulation, iterative reconstructions, and/or weight base dosing when appropriate to reduce radiation dose to as low as reasonably achievable. Electronically signed by: Jennifer Dominguez MD Date:    04/18/2024 Time:    14:09    X-Ray Chest AP Portable    Result Date: 4/18/2024  EXAMINATION: XR CHEST AP PORTABLE CLINICAL HISTORY: Respiratory distress., COMPARISON: 11/01/2023 x-ray. FINDINGS: Borderline cardiomegaly.  Mild aortic atherosclerosis. Clear lungs with no acute infiltrate or failure.     No acute findings. Electronically signed by: Trenton Haile MD Date:    04/18/2024 Time:    13:41    X-Ray Shoulder Trauma Right    Result Date: 4/18/2024  EXAMINATION: XR SHOULDER TRAUMA 3 VIEW RIGHT CLINICAL HISTORY: Pain in right shoulder TECHNIQUE: Standard radiography performed. COMPARISON: None FINDINGS: Bone density and architecture are normal.  No acute findings.     Negative exam. Electronically signed by: Trenton Haile MD Date:    04/18/2024 Time:    13:41    CT Head Without Contrast    Result Date: 4/18/2024  EXAMINATION: CT HEAD WITHOUT CONTRAST CLINICAL HISTORY: Headache, unspecified Head trauma, minor (Age >= 65y); TECHNIQUE: Standard noncontrast CT of the brain. All CT scans at this facility are performed  using dose modulation techniques as appropriate to performed exam including the following:  automated exposure control; adjustment of mA and/or kV according to the patients size (this includes techniques or standardized protocols for targeted exams where dose is matched to indication/reason for exam: i.e. extremities or head);  iterative reconstruction  technique. COMPARISON: 11/01/2023 MRI FINDINGS: Brain: The ventricles are moderately enlarged consistent with volume loss.  There are advanced white matter changes consistent with age-related small vessel ischemic degeneration.  Chronic appearing right thalamic lacunar infarct. No acute edema, hemorrhage or mass effect is present. Skull: The skullbase is intact.     Age-related atrophy.  No acute findings. Electronically signed by: Trenton Haile MD Date:    04/18/2024 Time:    13:39      EKG    MICROBIOLOGY    MDM     Amount and/or Complexity of Data Reviewed  Clinical lab tests: reviewed  Tests in the radiology section of CPT®: reviewed  Tests in the medicine section of CPT®: reviewed  Discussion of test results with the performing providers: yes  Decide to obtain previous medical records or to obtain history from someone other than the patient: yes  Obtain history from someone other than the patient: yes  Review and summarize past medical records: yes  Discuss the patient with other providers: yes  Independent visualization of images, tracings, or specimens: yes

## 2024-04-19 NOTE — ASSESSMENT & PLAN NOTE
Chronic, controlled. Latest blood pressure and vitals reviewed-     Temp:  [97.1 °F (36.2 °C)-97.7 °F (36.5 °C)]   Pulse:  [49-69]   Resp:  [13-21]   BP: (127-173)/(60-81)   SpO2:  [96 %-100 %] .   Home meds for hypertension were reviewed and noted below.   Hypertension Medications               hydrALAZINE (APRESOLINE) 25 MG tablet Take 50 mg by mouth every 12 (twelve) hours.    losartan-hydrochlorothiazide 100-25 mg (HYZAAR) 100-25 mg per tablet Take 1 tablet by mouth once daily.    metoprolol tartrate (LOPRESSOR) 100 MG tablet Take 1 tablet (100 mg total) by mouth once daily.    verapamil (CALAN-SR) 240 MG CR tablet Take 1 tablet (240 mg total) by mouth once daily.            While in the hospital, will manage blood pressure as follows; Continue home antihypertensive regimen    Will utilize p.r.n. blood pressure medication only if patient's blood pressure greater than 160/100 and she develops symptoms such as worsening chest pain or shortness of breath.

## 2024-04-19 NOTE — PROGRESS NOTES
Pharmacist Renal Dose Adjustment Note    Janet Hamilton is a 78 y.o. female being treated with the medication enoxaparin.    Patient Data:    Vital Signs (Most Recent):  Temp: 97.1 °F (36.2 °C) (04/18/24 2203)  Pulse: (!) 55 (04/18/24 2203)  Resp: 17 (04/18/24 2203)  BP: (!) 170/73 (04/18/24 2203)  SpO2: 99 % (04/18/24 2203) Vital Signs (72h Range):  Temp:  [97.1 °F (36.2 °C)-97.7 °F (36.5 °C)]   Pulse:  [49-69]   Resp:  [13-21]   BP: (127-173)/(60-81)   SpO2:  [96 %-100 %]      Recent Labs   Lab 04/18/24  1407   CREATININE 1.0     Serum creatinine: 1 mg/dL 04/18/24 1407  Estimated creatinine clearance: 39.7 mL/min    Enoxaparin 30 mg subcutaneous every 24 hours will be changed to enoxaparin 40 mg subcutaneous every 24 hours for the prevention of DVT with CrCl > 30 ml/min.    Pharmacist's Name: Marshall Maier  Pharmacist's Extension: [phone]

## 2024-04-19 NOTE — SUBJECTIVE & OBJECTIVE
Interval History: No dizziness, CP, or SOB. No further syncope.     Review of Systems   Constitutional:  Negative for appetite change, chills, diaphoresis, fatigue, fever and unexpected weight change.   HENT:  Negative for congestion, nosebleeds, sinus pressure and sore throat.    Eyes:  Negative for pain, discharge and visual disturbance.   Respiratory:  Negative for cough, chest tightness, shortness of breath, wheezing and stridor.    Cardiovascular:  Negative for chest pain, palpitations and leg swelling.   Gastrointestinal:  Negative for abdominal distention, abdominal pain, blood in stool, constipation, diarrhea, nausea and vomiting.   Endocrine: Negative for cold intolerance and heat intolerance.   Genitourinary:  Negative for difficulty urinating, dysuria, flank pain, frequency and urgency.   Musculoskeletal:  Positive for arthralgias (right shoulder discomfort). Negative for back pain, joint swelling, myalgias, neck pain and neck stiffness.   Skin:  Negative for rash and wound.   Allergic/Immunologic: Negative for food allergies and immunocompromised state.   Neurological:  Positive for syncope and headaches (improved). Negative for dizziness, seizures, facial asymmetry, speech difficulty, weakness, light-headedness and numbness.   Hematological:  Negative for adenopathy.   Psychiatric/Behavioral:  Negative for agitation, confusion and hallucinations.      Objective:     Vital Signs (Most Recent):  Temp: 97.1 °F (36.2 °C) (04/19/24 0744)  Pulse: 61 (04/19/24 0859)  Resp: 17 (04/19/24 0744)  BP: 135/65 (04/19/24 0859)  SpO2: 96 % (04/19/24 0744) Vital Signs (24h Range):  Temp:  [97.1 °F (36.2 °C)-98.1 °F (36.7 °C)] 97.1 °F (36.2 °C)  Pulse:  [48-69] 61  Resp:  [13-21] 17  SpO2:  [96 %-100 %] 96 %  BP: (127-198)/() 135/65     Weight: 62.6 kg (138 lb)  Body mass index is 25.24 kg/m².    Intake/Output Summary (Last 24 hours) at 4/19/2024 1116  Last data filed at 4/19/2024 0852  Gross per 24 hour   Intake 0  ml   Output 200 ml   Net -200 ml         Physical Exam  Vitals and nursing note reviewed.   Constitutional:       General: She is not in acute distress.     Appearance: She is well-developed. She is not diaphoretic.   HENT:      Head: Normocephalic and atraumatic.      Nose: Nose normal.   Eyes:      General: No scleral icterus.     Conjunctiva/sclera: Conjunctivae normal.   Neck:      Trachea: No tracheal deviation.   Cardiovascular:      Rate and Rhythm: Regular rhythm. Bradycardia present.      Heart sounds: Normal heart sounds. No murmur heard.     No friction rub. No gallop.   Pulmonary:      Effort: Pulmonary effort is normal. No respiratory distress.      Breath sounds: Normal breath sounds. No stridor. No wheezing or rales.   Chest:      Chest wall: No tenderness.   Abdominal:      General: Bowel sounds are normal. There is no distension.      Palpations: Abdomen is soft. There is no mass.      Tenderness: There is no abdominal tenderness. There is no guarding or rebound.   Musculoskeletal:         General: Tenderness (mild to right shoulder) present. No deformity. Normal range of motion.      Cervical back: Normal range of motion and neck supple.   Skin:     General: Skin is warm and dry.      Coloration: Skin is not pale.      Findings: No erythema or rash.   Neurological:      Mental Status: She is alert and oriented to person, place, and time.      Cranial Nerves: No cranial nerve deficit.      Motor: No abnormal muscle tone.      Coordination: Coordination normal.   Psychiatric:         Behavior: Behavior normal.         Thought Content: Thought content normal.             Significant Labs: All pertinent labs within the past 24 hours have been reviewed.    Significant Imaging: I have reviewed all pertinent imaging results/findings within the past 24 hours.

## 2024-04-19 NOTE — ASSESSMENT & PLAN NOTE
-Patient presents s/p syncopal episode  -Unclear etiology  -Troponin x 2 negative  -Needs arrhythmia ruled out, continue telemetry monitor, needs OP [facility]  -Orthostatic vitals negative  -Monitor BP trend  -TTE pending

## 2024-04-19 NOTE — ASSESSMENT & PLAN NOTE
Patient presented following syncope episode resulting in ground-level fall with regain of consciousness approximately few seconds later. Patient reported/denied endorsing presyncopal symptoms including (dizziness). Diffrential diagnosis include reflux syncope (vasovagal) vs orthostatic/postural likely secondary to volume depletion from dehydration in setting of medications as patient currently on diuretics, BB, CCB, ACE/ARB, vs cardiac arrhythmias/structural disease vs cerebrovascular disease (seizures/stroke).  Plan:  -Orthostatics  -EKG  -Echo  -Telemetry    -Carotid dopplers     CT head showed Chronic appearing right thalamic lacunar infarct, nothing acute. CT-C spine was unremarkable. Bradycardia noted-normal sinus bradycardia with no AV block. BP meds Lopressor and Verapamil were held. Echo pending. Cardiology consult pending.

## 2024-04-19 NOTE — PT/OT/SLP PROGRESS
Occupational Therapy      Patient Name:  Janet Hamilton   MRN:  [id number]    OT eval initiated via chart review. Patient not seen today secondary to HOLD per nurse Jayna d/t pt in supine with hypertension at this time, 198/91. Will follow-up as able.    4/19/2024  Leonela Petersen, OT   2264

## 2024-04-19 NOTE — PROGRESS NOTES
P.T. COMPLETED CHART REVIEW AND NURSE ANIBAL REQUESTED HOLD TX D/T PT SUP BP @ 198/91. P.T. TO COMPLETED EVAL ONCE MEDICALLY STABLE THANK YOU Pina Bear, PT

## 2024-04-19 NOTE — ASSESSMENT & PLAN NOTE
Chronic, controlled. Latest blood pressure and vitals reviewed-     Temp:  [97.1 °F (36.2 °C)-98.1 °F (36.7 °C)]   Pulse:  [48-69]   Resp:  [13-21]   BP: (127-198)/()   SpO2:  [96 %-100 %] .   Home meds for hypertension were reviewed and noted below.   Hypertension Medications               hydrALAZINE (APRESOLINE) 25 MG tablet Take 50 mg by mouth every 12 (twelve) hours.    losartan-hydrochlorothiazide 100-25 mg (HYZAAR) 100-25 mg per tablet Take 1 tablet by mouth once daily.    metoprolol tartrate (LOPRESSOR) 100 MG tablet Take 1 tablet (100 mg total) by mouth once daily.    verapamil (CALAN-SR) 240 MG CR tablet Take 1 tablet (240 mg total) by mouth once daily.            While in the hospital, will manage blood pressure as follows; Continue home antihypertensive regimen    Will utilize p.r.n. blood pressure medication only if patient's blood pressure greater than 160/100 and she develops symptoms such as worsening chest pain or shortness of breath.    BP elevated - as high as 186/100. Bradycardia noted-normal sinus bradycardia with no AV block. BP meds Lopressor and Verapamil were held. Pt was continued on Losartan/HCTZ and Hydralazine. BP improved. Renal artery U/S pending.

## 2024-04-19 NOTE — PROGRESS NOTES
[facility] Medicine  Progress Note    Patient Name: Janet Hamilton  MRN: [id number]  Patient Class: OP- Observation   Admission Date: 4/18/2024  Length of Stay: 0 days  Attending Physician: Belia Campbell MD  Primary Care Provider: Omid Morris MD        Subjective:     Principal Problem:Syncope        HPI:  Janet Hamilton is a 78 y.o. female with a PMH  has a past medical history of Anemia, Anxiety, Bronchitis, Enlarged heart, Hyperlipidemia, and Hypertension.  Presents as a transfer from our [facility] for further evaluation of syncopal episode she had prior to arrival at outside facility.  Patient reports she just finished eating breakfast when she went to put her plate on a counter.  Patient reports she felt lightheaded immediately had loss of consciousness.  Fall was witnessed by her sister.  Patient did hit her head in complains of headache and right shoulder pain following the fall.  Denies previous syncopal event.  Patient is followed by cardiologist, Dr. Huang.  Evaluated few months ago for routine checkup and states that everything was fine.  Denied any chest pain, palpitations, shortness of breath, dyspnea exertion, recent illnesses/sick contacts, or any other symptoms at this time.     ER workup revealed a  BNP level of 614.  Troponin x2.  UA negative.  Chest x-ray negative.  Cervical spine CT negative.  When shoulder x-ray negative for acute findings.  CT of the head revealed:[There are advanced white matter changes consistent with age-related small vessel ischemic degeneration.  Chronic appearing right thalamic lacunar infarct].  Hospital Medicine consulted to admit patient for further syncopal workup.  Patient in agreement with treatment plan.  Patient admitted under observation status.    PCP: Omid Morris        Overview/Hospital Course:  The patient is a 79 yo female with HTN, HLD, anemia, anxiety who was placed in observation after  having a syncopal episode with fall in her kitchen. +right shoulder pain after fall. Full ROM of right shoulder noted. Mildly TTP. CT head showed Chronic appearing right thalamic lacunar infarct, nothing acute. Right shoulder xray was normal. CT-C spine was unremarkable. BP elevated - as high as 186/100. Bradycardia noted-normal sinus bradycardia with no AV block. BP meds Lopressor and Verapamil were held. Pt was continued on Losartan/HCTZ and Hydralazine. BP improved. Renal artery U/S and Echo pending. Cardiology consult pending.     Interval History: No dizziness, CP, or SOB. No further syncope.     Review of Systems   Constitutional:  Negative for appetite change, chills, diaphoresis, fatigue, fever and unexpected weight change.   HENT:  Negative for congestion, nosebleeds, sinus pressure and sore throat.    Eyes:  Negative for pain, discharge and visual disturbance.   Respiratory:  Negative for cough, chest tightness, shortness of breath, wheezing and stridor.    Cardiovascular:  Negative for chest pain, palpitations and leg swelling.   Gastrointestinal:  Negative for abdominal distention, abdominal pain, blood in stool, constipation, diarrhea, nausea and vomiting.   Endocrine: Negative for cold intolerance and heat intolerance.   Genitourinary:  Negative for difficulty urinating, dysuria, flank pain, frequency and urgency.   Musculoskeletal:  Positive for arthralgias (right shoulder discomfort). Negative for back pain, joint swelling, myalgias, neck pain and neck stiffness.   Skin:  Negative for rash and wound.   Allergic/Immunologic: Negative for food allergies and immunocompromised state.   Neurological:  Positive for syncope and headaches (improved). Negative for dizziness, seizures, facial asymmetry, speech difficulty, weakness, light-headedness and numbness.   Hematological:  Negative for adenopathy.   Psychiatric/Behavioral:  Negative for agitation, confusion and hallucinations.      Objective:     Vital  Signs (Most Recent):  Temp: 97.1 °F (36.2 °C) (04/19/24 0744)  Pulse: 61 (04/19/24 0859)  Resp: 17 (04/19/24 0744)  BP: 135/65 (04/19/24 0859)  SpO2: 96 % (04/19/24 0744) Vital Signs (24h Range):  Temp:  [97.1 °F (36.2 °C)-98.1 °F (36.7 °C)] 97.1 °F (36.2 °C)  Pulse:  [48-69] 61  Resp:  [13-21] 17  SpO2:  [96 %-100 %] 96 %  BP: (127-198)/() 135/65     Weight: 62.6 kg (138 lb)  Body mass index is 25.24 kg/m².    Intake/Output Summary (Last 24 hours) at 4/19/2024 1116  Last data filed at 4/19/2024 0852  Gross per 24 hour   Intake 0 ml   Output 200 ml   Net -200 ml         Physical Exam  Vitals and nursing note reviewed.   Constitutional:       General: She is not in acute distress.     Appearance: She is well-developed. She is not diaphoretic.   HENT:      Head: Normocephalic and atraumatic.      Nose: Nose normal.   Eyes:      General: No scleral icterus.     Conjunctiva/sclera: Conjunctivae normal.   Neck:      Trachea: No tracheal deviation.   Cardiovascular:      Rate and Rhythm: Regular rhythm. Bradycardia present.      Heart sounds: Normal heart sounds. No murmur heard.     No friction rub. No gallop.   Pulmonary:      Effort: Pulmonary effort is normal. No respiratory distress.      Breath sounds: Normal breath sounds. No stridor. No wheezing or rales.   Chest:      Chest wall: No tenderness.   Abdominal:      General: Bowel sounds are normal. There is no distension.      Palpations: Abdomen is soft. There is no mass.      Tenderness: There is no abdominal tenderness. There is no guarding or rebound.   Musculoskeletal:         General: Tenderness (mild to right shoulder) present. No deformity. Normal range of motion.      Cervical back: Normal range of motion and neck supple.   Skin:     General: Skin is warm and dry.      Coloration: Skin is not pale.      Findings: No erythema or rash.   Neurological:      Mental Status: She is alert and oriented to person, place, and time.      Cranial Nerves: No  cranial nerve deficit.      Motor: No abnormal muscle tone.      Coordination: Coordination normal.   Psychiatric:         Behavior: Behavior normal.         Thought Content: Thought content normal.             Significant Labs: All pertinent labs within the past 24 hours have been reviewed.    Significant Imaging: I have reviewed all pertinent imaging results/findings within the past 24 hours.    Assessment/Plan:      * Syncope  Patient presented following syncope episode resulting in ground-level fall with regain of consciousness approximately few seconds later. Patient reported/denied endorsing presyncopal symptoms including (dizziness). Diffrential diagnosis include reflux syncope (vasovagal) vs orthostatic/postural likely secondary to volume depletion from dehydration in setting of medications as patient currently on diuretics, BB, CCB, ACE/ARB, vs cardiac arrhythmias/structural disease vs cerebrovascular disease (seizures/stroke).  Plan:  -Orthostatics  -EKG  -Echo  -Telemetry    -Carotid dopplers     CT head showed Chronic appearing right thalamic lacunar infarct, nothing acute. CT-C spine was unremarkable. Bradycardia noted-normal sinus bradycardia with no AV block. BP meds Lopressor and Verapamil were held. Echo pending. Cardiology consult pending.     Bradycardia  Hold Lopressor and Verapamil   Check TSh  Cardiology consulted     Essential hypertension, benign  Chronic, controlled. Latest blood pressure and vitals reviewed-     Temp:  [97.1 °F (36.2 °C)-98.1 °F (36.7 °C)]   Pulse:  [48-69]   Resp:  [13-21]   BP: (127-198)/()   SpO2:  [96 %-100 %] .   Home meds for hypertension were reviewed and noted below.   Hypertension Medications               hydrALAZINE (APRESOLINE) 25 MG tablet Take 50 mg by mouth every 12 (twelve) hours.    losartan-hydrochlorothiazide 100-25 mg (HYZAAR) 100-25 mg per tablet Take 1 tablet by mouth once daily.    metoprolol tartrate (LOPRESSOR) 100 MG tablet Take 1 tablet (100  mg total) by mouth once daily.    verapamil (CALAN-SR) 240 MG CR tablet Take 1 tablet (240 mg total) by mouth once daily.            While in the hospital, will manage blood pressure as follows; Continue home antihypertensive regimen    Will utilize p.r.n. blood pressure medication only if patient's blood pressure greater than 160/100 and she develops symptoms such as worsening chest pain or shortness of breath.    BP elevated - as high as 186/100. Bradycardia noted-normal sinus bradycardia with no AV block. BP meds Lopressor and Verapamil were held. Pt was continued on Losartan/HCTZ and Hydralazine. BP improved. Renal artery U/S pending.      Elevated brain natriuretic peptide (BNP) level  Plan:  -Echo        VTE Risk Mitigation (From admission, onward)           Ordered     enoxaparin injection 40 mg  Daily         04/18/24 2205     IP VTE HIGH RISK PATIENT  Once         04/18/24 2205     Place sequential compression device  Until discontinued         04/18/24 2205                    Discharge Planning   SABI:      Code Status: Full Code   Is the patient medically ready for discharge?:     Reason for patient still in hospital (select all that apply): Patient trending condition  Discharge Plan A: Home with family                  Jerica Quick NP  Department of Hospital Medicine   [facility] - Telemetry ([facility])

## 2024-04-19 NOTE — PLAN OF CARE
Inpatient Upgrade Note    Janet Hamilton has warranted treatment spanning two or more midnights of hospital level care for the management of Syncope, and monitoring for Potential arrhythmias . She continues to require monitoring of vital signs and medication adjustments. Her condition is also complicated by the following comorbidities: Hypertension, Anemia, Anxiety, Bronchitis, Enlarged heart,and  Hyperlipidemia .

## 2024-04-19 NOTE — H&P
[facility]  History & Physical    Patient Name: Janet Hamilton  MRN: [id number]  Patient Class: OP- Observation  Admission Date: 4/18/2024  Attending Physician: Melinda Pena MD   Primary Care Provider: Omid Morris MD         Patient information was obtained from patient, past medical records, and ER records.     Subjective:     Principal Problem:Syncope    Chief Complaint:   Chief Complaint   Patient presents with    Fall     [place] lightheaded and fell c/o R shoulder pain and headache.        HPI: Janet Hamilton is a 78 y.o. female with a PMH  has a past medical history of Anemia, Anxiety, Bronchitis, Enlarged heart, Hyperlipidemia, and Hypertension.  Presents as a transfer from our [facility] facility for further evaluation of syncopal episode she had prior to arrival at outside facility.  Patient reports she just finished eating breakfast when she went to put her plate on a counter.  Patient reports she felt lightheaded immediately had loss of consciousness.  Fall was witnessed by her sister.  Patient did hit her head in complains of headache and right shoulder pain following the fall.  Denies previous syncopal event.  Patient is followed by cardiologist, Dr. Huang.  Evaluated few months ago for routine checkup and states that everything was fine.  Denied any chest pain, palpitations, shortness of breath, dyspnea exertion, recent illnesses/sick contacts, or any other symptoms at this time.     ER workup revealed a  BNP level of 614.  Troponin x2.  UA negative.  Chest x-ray negative.  Cervical spine CT negative.  When shoulder x-ray negative for acute findings.  CT of the head revealed:[There are advanced white matter changes consistent with age-related small vessel ischemic degeneration.  Chronic appearing right thalamic lacunar infarct].  Hospital Medicine consulted to admit patient for further syncopal workup.  Patient in agreement with treatment  plan.  Patient admitted under observation status.    PCP: Omid Morris        Past Medical History:   Diagnosis Date    Anemia     Anxiety     Bronchitis     Enlarged heart     Hyperlipidemia     Hypertension        Past Surgical History:   Procedure Laterality Date    TUBAL LIGATION      VAGINAL PROLAPSE REPAIR  09/10/2013    Uterus prolapse/ [facility]       Review of patient's allergies indicates:  No Known Allergies    Current Facility-Administered Medications   Medication Dose Route Frequency Provider Last Rate Last Admin    acetaminophen suppository 650 mg  650 mg Rectal Q4H PRN Kavon Maxwell NP        acetaminophen tablet 650 mg  650 mg Oral Q8H PRN Kavon Maxwell NP        aluminum-magnesium hydroxide-simethicone 200-200-20 mg/5 mL suspension 30 mL  30 mL Oral QID PRN Kavon Maxwell NP        dextrose 10% bolus 125 mL 125 mL  12.5 g Intravenous PRN Kavon Maxwell NP        dextrose 10% bolus 250 mL 250 mL  25 g Intravenous PRN Kavon Maxwell NP        enoxaparin injection 40 mg  40 mg Subcutaneous Daily Kavon Maxwell NP   40 mg at 04/18/24 2319    glucagon (human recombinant) injection 1 mg  1 mg Intramuscular PRN Kavon Maxwell NP        glucose chewable tablet 16 g  16 g Oral PRN Kavon Maxwell NP        glucose chewable tablet 24 g  24 g Oral PRN Kavon Maxwell NP        hydrALAZINE tablet 50 mg  50 mg Oral Q12H Kavon Maxwell NP        hydroCHLOROthiazide tablet 25 mg  25 mg Oral Daily Kavon Maxwell NP        HYDROcodone-acetaminophen 5-325 mg per tablet 1 tablet  1 tablet Oral Q6H PRN Kavon Maxwell NP        losartan tablet 100 mg  100 mg Oral Daily Kavon Maxwell NP        melatonin tablet 6 mg  6 mg Oral Nightly PRN Kavon Maxwell NP        metoprolol tartrate (LOPRESSOR) tablet 50 mg  50 mg Oral BID Kavon Maxwell NP        morphine injection 2 mg  2 mg Intravenous Q4H PRN Kavon Maxwell NP        naloxone 0.4  mg/mL injection 0.02 mg  0.02 mg Intravenous PRN Kavon Maxwell NP        ondansetron injection 4 mg  4 mg Intravenous Q8H PRN Kavon Maxwell NP        polyethylene glycol packet 17 g  17 g Oral Daily PRN Kavon Maxwell NP        promethazine tablet 25 mg  25 mg Oral Q6H PRN Kavon Maxwell NP        simethicone chewable tablet 80 mg  1 tablet Oral QID PRN Kavon Maxwell NP        sodium chloride 0.9% flush 3 mL  3 mL Intravenous Q12H PRN Kavon Maxwell NP        verapamiL CR tablet 240 mg  240 mg Oral Daily Kavon Maxwell NP         Family History       Problem Relation (Age of Onset)    Stroke Brother          Tobacco Use    Smoking status: Never    Smokeless tobacco: Never   Substance and Sexual Activity    Alcohol use: Yes     Comment: occasional     Drug use: No    Sexual activity: Not Currently     Review of Systems   Gastrointestinal:  Negative for diarrhea, nausea and vomiting.   Musculoskeletal:  Negative for arthralgias, back pain, myalgias, neck pain and neck stiffness.   Neurological:  Positive for dizziness, syncope, light-headedness and headaches. Negative for weakness.   All other systems reviewed and are negative.    Objective:     Vital Signs (Most Recent):  Temp: 97.1 °F (36.2 °C) (04/18/24 2203)  Pulse: (!) 59 (04/18/24 2212)  Resp: 17 (04/18/24 2203)  BP: (!) 170/73 (04/18/24 2203)  SpO2: 99 % (04/18/24 2203) Vital Signs (24h Range):  Temp:  [97.1 °F (36.2 °C)-97.7 °F (36.5 °C)] 97.1 °F (36.2 °C)  Pulse:  [49-69] 59  Resp:  [13-21] 17  SpO2:  [96 %-100 %] 99 %  BP: (127-173)/(60-81) 170/73     Weight: 62.7 kg (138 lb 3.7 oz)  Body mass index is 25.28 kg/m².     Physical Exam  Vitals and nursing note reviewed.   Constitutional:       General: She is awake. She is not in acute distress.     Appearance: Normal appearance. She is well-developed and well-groomed. She is not ill-appearing, toxic-appearing or diaphoretic.   HENT:      Head: Normocephalic and atraumatic.    Eyes:      Extraocular Movements: Extraocular movements intact.      Conjunctiva/sclera: Conjunctivae normal.   Cardiovascular:      Rate and Rhythm: Normal rate and regular rhythm.      Heart sounds: Normal heart sounds. No murmur heard.  Pulmonary:      Effort: Pulmonary effort is normal.      Breath sounds: Normal breath sounds.   Abdominal:      General: Bowel sounds are normal.      Palpations: Abdomen is soft.      Tenderness: There is no abdominal tenderness.   Musculoskeletal:      Cervical back: Normal range of motion and neck supple.      Comments: 5/5 strength throughout   Skin:     General: Skin is warm and dry.      Capillary Refill: Capillary refill takes less than 2 seconds.   Neurological:      General: No focal deficit present.      Mental Status: She is alert and oriented to person, place, and time. Mental status is at baseline.      GCS: GCS eye subscore is 4. GCS verbal subscore is 5. GCS motor subscore is 6.      Cranial Nerves: Cranial nerves 2-12 are intact.      Sensory: Sensation is intact.      Motor: Motor function is intact.   Psychiatric:         Mood and Affect: Mood normal.         Speech: Speech normal.         Behavior: Behavior normal. Behavior is cooperative.              LABS:  Recent Results (from the past 24 hour(s))   POCT glucose    Collection Time: 04/18/24 12:23 PM   Result Value Ref Range    POCT Glucose 104 70 - 110 mg/dL   Urinalysis, Reflex to Urine Culture Urine, Clean Catch    Collection Time: 04/18/24  1:25 PM    Specimen: Urine   Result Value Ref Range    Specimen UA Urine, Clean Catch     Color, UA Yellow Yellow, Straw, [place]    Appearance, UA Clear Clear    pH, UA 7.0 5.0 - 8.0    Specific Gravity, UA 1.015 1.005 - 1.030    Protein, UA Negative Negative    Glucose, UA Negative Negative    Ketones, UA Negative Negative    Bilirubin (UA) Negative Negative    Occult Blood UA Negative Negative    Nitrite, UA Negative Negative    Urobilinogen, UA Negative <2.0 EU/dL     Leukocytes, UA Negative Negative   CBC auto differential    Collection Time: 04/18/24  2:07 PM   Result Value Ref Range    WBC 5.05 3.90 - 12.70 K/uL    RBC 3.87 (L) 4.00 - 5.40 M/uL    Hemoglobin 10.8 (L) 12.0 - 16.0 g/dL    Hematocrit 34.4 (L) 37.0 - 48.5 %    MCV 89 82 - 98 fL    MCH 27.9 27.0 - 31.0 pg    MCHC 31.4 (L) 32.0 - 36.0 g/dL    RDW 17.6 (H) 11.5 - 14.5 %    Platelets 192 150 - 450 K/uL    MPV 11.0 9.2 - 12.9 fL    Immature Granulocytes 0.2 0.0 - 0.5 %    Gran # (ANC) 2.8 1.8 - 7.7 K/uL    Immature Grans (Abs) 0.01 0.00 - 0.04 K/uL    Lymph # 1.3 1.0 - 4.8 K/uL    Mono # 0.9 0.3 - 1.0 K/uL    Eos # 0.1 0.0 - 0.5 K/uL    Baso # 0.05 0.00 - 0.20 K/uL    nRBC 0 0 /100 WBC    Gran % 54.9 38.0 - 73.0 %    Lymph % 25.9 18.0 - 48.0 %    Mono % 17.0 (H) 4.0 - 15.0 %    Eosinophil % 1.0 0.0 - 8.0 %    Basophil % 1.0 0.0 - 1.9 %    Differential Method Automated    Comprehensive metabolic panel    Collection Time: 04/18/24  2:07 PM   Result Value Ref Range    Sodium 141 136 - 145 mmol/L    Potassium 4.8 3.5 - 5.1 mmol/L    Chloride 106 95 - 110 mmol/L    CO2 24 23 - 29 mmol/L    Glucose 82 70 - 110 mg/dL    BUN 21 8 - 23 mg/dL    Creatinine 1.0 0.5 - 1.4 mg/dL    Calcium 9.5 8.7 - 10.5 mg/dL    Total Protein 7.7 6.0 - 8.4 g/dL    Albumin 3.9 3.5 - 5.2 g/dL    Total Bilirubin 0.3 0.1 - 1.0 mg/dL    Alkaline Phosphatase 39 (L) 55 - 135 U/L    AST 22 10 - 40 U/L    ALT 14 10 - 44 U/L    eGFR 57.3 (A) >60 mL/min/1.73 m^2    Anion Gap 11 8 - 16 mmol/L   Troponin I #1    Collection Time: 04/18/24  2:07 PM   Result Value Ref Range    Troponin I <0.006 0.000 - 0.026 ng/mL   BNP    Collection Time: 04/18/24  2:07 PM   Result Value Ref Range     (H) 0 - 99 pg/mL   Troponin I #2    Collection Time: 04/18/24  4:21 PM   Result Value Ref Range    Troponin I <0.006 0.000 - 0.026 ng/mL       RADIOLOGY  CT Cervical Spine Without Contrast    Result Date: 4/18/2024  EXAMINATION: CT CERVICAL SPINE WITHOUT CONTRAST CLINICAL  HISTORY: Neck trauma (Age >= 65y); TECHNIQUE: Thin section axial images were acquired. Reformatted images were generated in the sagittal and coronal planes. COMPARISON: None FINDINGS: No fracture, precervical soft tissue swelling, or subluxation identified. No CT evidence of central canal stenosis or traumatic disc herniation.  Multilevel degenerative     Unremarkable exam. All CT scans at this facility use dose modulation, iterative reconstructions, and/or weight base dosing when appropriate to reduce radiation dose to as low as reasonably achievable. Electronically signed by: Jennifer Dominguez MD Date:    04/18/2024 Time:    14:09    X-Ray Chest AP Portable    Result Date: 4/18/2024  EXAMINATION: XR CHEST AP PORTABLE CLINICAL HISTORY: Respiratory distress., COMPARISON: 11/01/2023 x-ray. FINDINGS: Borderline cardiomegaly.  Mild aortic atherosclerosis. Clear lungs with no acute infiltrate or failure.     No acute findings. Electronically signed by: Trenton Haile MD Date:    04/18/2024 Time:    13:41    X-Ray Shoulder Trauma Right    Result Date: 4/18/2024  EXAMINATION: XR SHOULDER TRAUMA 3 VIEW RIGHT CLINICAL HISTORY: Pain in right shoulder TECHNIQUE: Standard radiography performed. COMPARISON: None FINDINGS: Bone density and architecture are normal.  No acute findings.     Negative exam. Electronically signed by: Trenton Haile MD Date:    04/18/2024 Time:    13:41    CT Head Without Contrast    Result Date: 4/18/2024  EXAMINATION: CT HEAD WITHOUT CONTRAST CLINICAL HISTORY: Headache, unspecified Head trauma, minor (Age >= 65y); TECHNIQUE: Standard noncontrast CT of the brain. All CT scans at this facility are performed  using dose modulation techniques as appropriate to performed exam including the following:  automated exposure control; adjustment of mA and/or kV according to the patients size (this includes techniques or standardized protocols for targeted exams where dose is matched to indication/reason for  exam: i.e. extremities or head);  iterative reconstruction technique. COMPARISON: 11/01/2023 MRI FINDINGS: Brain: The ventricles are moderately enlarged consistent with volume loss.  There are advanced white matter changes consistent with age-related small vessel ischemic degeneration.  Chronic appearing right thalamic lacunar infarct. No acute edema, hemorrhage or mass effect is present. Skull: The skullbase is intact.     Age-related atrophy.  No acute findings. Electronically signed by: Trenton Haile MD Date:    04/18/2024 Time:    13:39      EKG    MICROBIOLOGY    MDM     Amount and/or Complexity of Data Reviewed  Clinical lab tests: reviewed  Tests in the radiology section of CPT®: reviewed  Tests in the medicine section of CPT®: reviewed  Discussion of test results with the performing providers: yes  Decide to obtain previous medical records or to obtain history from someone other than the patient: yes  Obtain history from someone other than the patient: yes  Review and summarize past medical records: yes  Discuss the patient with other providers: yes  Independent visualization of images, tracings, or specimens: yes        Assessment/Plan:     * Syncope  Patient presented following syncope episode resulting in ground-level fall with regain of consciousness approximately few seconds later. Patient reported/denied endorsing presyncopal symptoms including (dizziness). Diffrential diagnosis include reflux syncope (vasovagal) vs orthostatic/postural likely secondary to volume depletion from dehydration in setting of medications as patient currently on diuretics, BB, CCB, ACE/ARB, vs cardiac arrhythmias/structural disease vs cerebrovascular disease (seizures/stroke).  Plan:  -Orthostatics  -EKG  -Echo  -Telemetry    -Carotid dopplers         Elevated brain natriuretic peptide (BNP) level  Plan:  -Echo      Essential hypertension, benign  Chronic, controlled. Latest blood pressure and vitals reviewed-     Temp:   [97.1 °F (36.2 °C)-97.7 °F (36.5 °C)]   Pulse:  [49-69]   Resp:  [13-21]   BP: (127-173)/(60-81)   SpO2:  [96 %-100 %] .   Home meds for hypertension were reviewed and noted below.   Hypertension Medications               hydrALAZINE (APRESOLINE) 25 MG tablet Take 50 mg by mouth every 12 (twelve) hours.    losartan-hydrochlorothiazide 100-25 mg (HYZAAR) 100-25 mg per tablet Take 1 tablet by mouth once daily.    metoprolol tartrate (LOPRESSOR) 100 MG tablet Take 1 tablet (100 mg total) by mouth once daily.    verapamil (CALAN-SR) 240 MG CR tablet Take 1 tablet (240 mg total) by mouth once daily.            While in the hospital, will manage blood pressure as follows; Continue home antihypertensive regimen    Will utilize p.r.n. blood pressure medication only if patient's blood pressure greater than 160/100 and she develops symptoms such as worsening chest pain or shortness of breath.      VTE Risk Mitigation (From admission, onward)           Ordered     enoxaparin injection 40 mg  Daily         04/18/24 2205     IP VTE HIGH RISK PATIENT  Once         04/18/24 2205     Place sequential compression device  Until discontinued         04/18/24 2205                  //Core Measures   -DVT proph: SCDs, lovenox  -Code status Full    -Surrogate:daughter      Components of this note were documented using a voice recognition system and are subject to errors not corrected at the time the document was proof read. Please contact the author for any clarifications.     Pharmacist Renal Dose Adjustment Note    Janet Hamilton is a 78 y.o. female being treated with the medication enoxaparin.    Patient Data:    Vital Signs (Most Recent):  Temp: 97.1 °F (36.2 °C) (04/18/24 2203)  Pulse: (!) 55 (04/18/24 2203)  Resp: 17 (04/18/24 2203)  BP: (!) 170/73 (04/18/24 2203)  SpO2: 99 % (04/18/24 2203) Vital Signs (72h Range):  Temp:  [97.1 °F (36.2 °C)-97.7 °F (36.5 °C)]   Pulse:  [49-69]   Resp:  [13-21]   BP: (127-173)/(60-81)   SpO2:   [96 %-100 %]      Recent Labs   Lab 04/18/24  1407   CREATININE 1.0     Serum creatinine: 1 mg/dL 04/18/24 1407  Estimated creatinine clearance: 39.7 mL/min    Enoxaparin 30 mg subcutaneous every 24 hours will be changed to enoxaparin 40 mg subcutaneous every 24 hours for the prevention of DVT with CrCl > 30 ml/min.    Pharmacist's Name: Marshall Maier  Pharmacist's Extension: [phone]      Kavon Maxwell NP  Department of Hospital Medicine  [facility] - Telemetry ([facility])

## 2024-04-20 VITALS
BODY MASS INDEX: 25.4 KG/M2 | HEIGHT: 62 IN | SYSTOLIC BLOOD PRESSURE: 113 MMHG | DIASTOLIC BLOOD PRESSURE: 63 MMHG | RESPIRATION RATE: 19 BRPM | OXYGEN SATURATION: 98 % | WEIGHT: 138 LBS | TEMPERATURE: 97 F | HEART RATE: 70 BPM

## 2024-04-20 LAB — TSH SERPL DL<=0.005 MIU/L-ACNC: 2.51 UIU/ML (ref 0.4–4)

## 2024-04-20 PROCEDURE — 97162 PT EVAL MOD COMPLEX 30 MIN: CPT

## 2024-04-20 PROCEDURE — 25000003 PHARM REV CODE 250: Performed by: NURSE PRACTITIONER

## 2024-04-20 PROCEDURE — 97530 THERAPEUTIC ACTIVITIES: CPT

## 2024-04-20 PROCEDURE — 25000003 PHARM REV CODE 250: Performed by: FAMILY MEDICINE

## 2024-04-20 PROCEDURE — 36415 COLL VENOUS BLD VENIPUNCTURE: CPT | Performed by: NURSE PRACTITIONER

## 2024-04-20 PROCEDURE — 97166 OT EVAL MOD COMPLEX 45 MIN: CPT

## 2024-04-20 PROCEDURE — 84443 ASSAY THYROID STIM HORMONE: CPT | Performed by: NURSE PRACTITIONER

## 2024-04-20 PROCEDURE — 97116 GAIT TRAINING THERAPY: CPT

## 2024-04-20 RX ORDER — AMLODIPINE BESYLATE 5 MG/1
5 TABLET ORAL DAILY
Qty: 30 TABLET | Refills: 0 | Status: SHIPPED | OUTPATIENT
Start: 2024-04-21 | End: 2025-04-21

## 2024-04-20 RX ORDER — HYDRALAZINE HYDROCHLORIDE 25 MG/1
75 TABLET, FILM COATED ORAL EVERY 12 HOURS
Qty: 90 TABLET | Refills: 0 | Status: SHIPPED | OUTPATIENT
Start: 2024-04-20 | End: 2025-04-20

## 2024-04-20 RX ORDER — AMLODIPINE BESYLATE 10 MG/1
10 TABLET ORAL DAILY
Status: DISCONTINUED | OUTPATIENT
Start: 2024-04-20 | End: 2024-04-20 | Stop reason: HOSPADM

## 2024-04-20 RX ORDER — LOSARTAN POTASSIUM AND HYDROCHLOROTHIAZIDE 25; 100 MG/1; MG/1
1 TABLET ORAL DAILY
Qty: 30 TABLET | Refills: 0 | Status: SHIPPED | OUTPATIENT
Start: 2024-04-20

## 2024-04-20 RX ADMIN — HYDRALAZINE HYDROCHLORIDE 50 MG: 50 TABLET ORAL at 05:04

## 2024-04-20 RX ADMIN — AMLODIPINE BESYLATE 10 MG: 10 TABLET ORAL at 09:04

## 2024-04-20 RX ADMIN — HYDROCHLOROTHIAZIDE 25 MG: 25 TABLET ORAL at 09:04

## 2024-04-20 RX ADMIN — LOSARTAN POTASSIUM 100 MG: 50 TABLET, FILM COATED ORAL at 09:04

## 2024-04-20 NOTE — CONSULTS
Leonora spoke with pt. SW explained role and reason for the call. Pt consented to sending a home health referral to [facility]. SW sent referral to [facility] via [employer] and notified the on call.[facility] reports pt is current and they will follow up.

## 2024-04-20 NOTE — PLAN OF CARE
A228/A228 MADI  Janet Hamilton is a 78 y.o.female admitted on 4/18/2024 for Syncope   Code Status: Full Code MRN: [id number]   Review of patient's allergies indicates:  No Known Allergies  Past Medical History:   Diagnosis Date    Anemia     Anxiety     Bronchitis     Enlarged heart     Hyperlipidemia     Hypertension       PRN meds    acetaminophen, 650 mg, Q4H PRN  acetaminophen, 650 mg, Q8H PRN  aluminum-magnesium hydroxide-simethicone, 30 mL, QID PRN  dextrose 10%, 12.5 g, PRN  dextrose 10%, 25 g, PRN  glucagon (human recombinant), 1 mg, PRN  glucose, 16 g, PRN  glucose, 24 g, PRN  melatonin, 6 mg, Nightly PRN  naloxone, 0.02 mg, PRN  ondansetron, 4 mg, Q8H PRN  polyethylene glycol, 17 g, Daily PRN  promethazine, 25 mg, Q6H PRN  simethicone, 1 tablet, QID PRN  sodium chloride 0.9%, 3 mL, Q12H PRN      AVS Discharge instructions received and reviewed with pt and family at bedside.  Pt voiced understanding and all questions answered to satisfaction.  Stressed importance to making and keeping all follow up appointments.  Medications sent to pt pharmacy of choice.  Tele monitor removed and brought to monitor tech.  IV d/c'd with tip intact, pressure dressing applied.  Pt will call when ready to be transported to front  hospital via w/c to be discharged home.      Orientation: oriented x 4  Jeovany Coma Scale Score: 15     Lead Monitored: Lead II Rhythm: normal sinus rhythm    Cardiac/Telemetry Box Number: [id number]  VTE Required Core Measure: (SCDs) Sequential compression device initiated/maintained Last Bowel Movement: 04/17/24  Diet Cardiac  Diet Cardiac  Voiding Characteristics: external catheter  Sandip Score: 21  Fall Risk Score: 16  Accucheck []   Freq?      Lines/Drains/Airways       Peripheral Intravenous Line  Duration                  Peripheral IV - Single Lumen 04/18/24 1400 22 G Posterior;Right Hand 2 days         Peripheral IV - Single Lumen 04/18/24 1623 20 G Left Antecubital 1 day                        Problem: Adult Inpatient Plan of Care  Goal: Plan of Care Review  Outcome: Met  Goal: Patient-Specific Goal (Individualized)  Outcome: Met  Goal: Absence of Hospital-Acquired Illness or Injury  Outcome: Met  Goal: Optimal Comfort and Wellbeing  Outcome: Met  Goal: Readiness for Transition of Care  Outcome: Met     Problem: Fall Injury Risk  Goal: Absence of Fall and Fall-Related Injury  Outcome: Met

## 2024-04-20 NOTE — DISCHARGE SUMMARY
[facility] - Telemetry ([facility])  [facility] Medicine  Discharge Summary      Patient Name: Janet Hamilton  MRN: [id number]  [facility]: [id number]  Patient Class: IP- Inpatient  Admission Date: 4/18/2024  Hospital Length of Stay: 1 days  Discharge Date and Time:  04/20/2024 3:15 PM  Attending Physician: Belia Campbell MD   Discharging Provider: Jerica Quick NP  Primary Care Provider: Omid Morris MD    Primary Care Team: Networked reference to record PCT     HPI:   Janet Hamilton is a 78 y.o. female with a PMH  has a past medical history of Anemia, Anxiety, Bronchitis, Enlarged heart, Hyperlipidemia, and Hypertension.  Presents as a transfer from our [facility] facility for further evaluation of syncopal episode she had prior to arrival at outside facility.  Patient reports she just finished eating breakfast when she went to put her plate on a counter.  Patient reports she felt lightheaded immediately had loss of consciousness.  Fall was witnessed by her sister.  Patient did hit her head in complains of headache and right shoulder pain following the fall.  Denies previous syncopal event.  Patient is followed by cardiologist, Dr. Huang.  Evaluated few months ago for routine checkup and states that everything was fine.  Denied any chest pain, palpitations, shortness of breath, dyspnea exertion, recent illnesses/sick contacts, or any other symptoms at this time.     ER workup revealed a  BNP level of 614.  Troponin x2.  UA negative.  Chest x-ray negative.  Cervical spine CT negative.  When shoulder x-ray negative for acute findings.  CT of the head revealed:[There are advanced white matter changes consistent with age-related small vessel ischemic degeneration.  Chronic appearing right thalamic lacunar infarct].  Hospital Medicine consulted to admit patient for further syncopal workup.  Patient in agreement with treatment plan.  Patient admitted under observation status.    PCP: Omid Morris  ERIC            Hospital Course:   The patient is a 77 yo female with HTN, HLD, Anemia, Anxiety who was placed in observation after having a syncopal episode with fall in her kitchen. +right shoulder pain after fall. Full ROM of right shoulder noted. Mildly TTP. CT head showed chronic appearing right thalamic lacunar infarct, nothing acute. Right shoulder xray was normal. CT-C spine was unremarkable. Carotid u/s showed no significant stenosis. Echo showed normal LVEF, concentric hypertrophy, mild AS. Serial troponin normal. BP elevated - as high as 186/100. Renal artery U/S showed no stenosis. Cardiology consulted. Bradycardia noted-normal sinus bradycardia with no AV block. BP meds Lopressor and Verapamil were held. HR improved. Pt was continued on Losartan/HCTZ and Hydralazine. BP improved but remained mildly elevated. Hydralazine increased to 75mg BID and Amlodipine added. BP stabilized. Cardiology recommended OP Holter monitor ([facility]). PT/OT evalauted pt. Pt was able to ambualte and sit up without dizziness/syncope. Pt was counseled to f/u with PCP for BP recheck and with her Cardiologist for syncope, bradycardia, and OP Holter monitor.   The patient was seen and examined today and determined to be stable for discharge.       Goals of Care Treatment Preferences:  Code Status: Full Code      Consults:   Consults (From admission, onward)          Status Ordering Provider     Inpatient consult to Social Work  Once        Provider:  (Not yet assigned)    Ordered DAVE FIELD     Inpatient consult to Cardiology  Once        Provider:  Clarissa Cline MD    Completed DAVE FIELD            Final Active Diagnoses:    Diagnosis Date Noted POA    PRINCIPAL PROBLEM:  Syncope [R55] 04/19/2024 Yes    Bradycardia [R00.1] 04/19/2024 Yes    Essential hypertension, benign [I10] 07/18/2013 Yes    Elevated brain natriuretic peptide (BNP) level [R79.89] 04/19/2024 Yes      Problems Resolved During this Admission:        Discharged Condition: stable    Disposition: Home-Health Care [facility]    Follow Up:   Follow-up Information       Omid Morris MD Follow up in 3 day(s).    Specialty: Pathology  Why: BP recheck  Contact information:  [address], [address]  [facility] [address]  [phone]               Diana Huang MD Follow up.    Specialty: Cardiology  Why: follow up with Cardiology regarding low heart rate and Holter monitor  Contact information:  [address]  [facility] [address]  [phone]                           Patient Instructions:      Diet Cardiac     Activity as tolerated       Significant Diagnostic Studies:   Imaging Results              CT Cervical Spine Without Contrast (Final result)  Result time 04/18/24 14:09:36      Final result by Jennifer [facility] MD (04/18/24 14:09:36)                   Impression:      Unremarkable exam.    All CT scans at this facility use dose modulation, iterative reconstructions, and/or weight base dosing when appropriate to reduce radiation dose to as low as reasonably achievable.      Electronically signed by: Jennifer Dominguez MD  Date:    04/18/2024  Time:    14:09               Narrative:    EXAMINATION:  CT CERVICAL SPINE WITHOUT CONTRAST    CLINICAL HISTORY:  Neck trauma (Age >= 65y);    TECHNIQUE:  Thin section axial images were acquired. Reformatted images were generated in the sagittal and coronal planes.    COMPARISON:  None    FINDINGS:  No fracture, precervical soft tissue swelling, or subluxation identified. No CT evidence of central canal stenosis or traumatic disc herniation.  Multilevel degenerative                                       X-Ray Shoulder Trauma Right (Final result)  Result time 04/18/24 13:41:17      Final result by Trenton Haile MD (04/18/24 13:41:17)                   Impression:      Negative exam.      Electronically signed by: Trenton Haile MD  Date:    04/18/2024  Time:    13:41                Narrative:    EXAMINATION:  XR SHOULDER TRAUMA 3 VIEW RIGHT    CLINICAL HISTORY:  Pain in right shoulder    TECHNIQUE:  Standard radiography performed.    COMPARISON:  None    FINDINGS:  Bone density and architecture are normal.  No acute findings.                                       X-Ray Chest AP Portable (Final result)  Result time 04/18/24 13:41:51      Final result by Trenton Haile MD (04/18/24 13:41:51)                   Impression:      No acute findings.      Electronically signed by: Trenton Haile MD  Date:    04/18/2024  Time:    13:41               Narrative:    EXAMINATION:  XR CHEST AP PORTABLE    CLINICAL HISTORY:  Respiratory distress.,    COMPARISON:  11/01/2023 x-ray.    FINDINGS:  Borderline cardiomegaly.  Mild aortic atherosclerosis.    Clear lungs with no acute infiltrate or failure.                                       CT Head Without Contrast (Final result)  Result time 04/18/24 13:39:06      Final result by Trenton Haile MD (04/18/24 13:39:06)                   Impression:      Age-related atrophy.  No acute findings.      Electronically signed by: Trenton Haile MD  Date:    04/18/2024  Time:    13:39               Narrative:    EXAMINATION:  CT HEAD WITHOUT CONTRAST    CLINICAL HISTORY:  Headache, unspecified Head trauma, minor (Age >= 65y);    TECHNIQUE:  Standard noncontrast CT of the brain.    All CT scans at this facility are performed  using dose modulation techniques as appropriate to performed exam including the following:  automated exposure control; adjustment of mA and/or kV according to the patients size (this includes techniques or standardized protocols for targeted exams where dose is matched to indication/reason for exam: i.e. extremities or head);  iterative reconstruction technique.    COMPARISON:  11/01/2023 MRI    FINDINGS:  Brain: The ventricles are moderately enlarged consistent with volume loss.  There are advanced white matter changes  consistent with age-related small vessel ischemic degeneration.  Chronic appearing right thalamic lacunar infarct.    No acute edema, hemorrhage or mass effect is present.    Skull: The skullbase is intact.                                       Pending Diagnostic Studies:       None           Medications:  Reconciled Home Medications:      Medication List        START taking these medications      amLODIPine 5 MG tablet  Commonly known as: NORVASC  Take 1 tablet (5 mg total) by mouth once daily.  Start taking on: April 21, 2024            CHANGE how you take these medications      hydrALAZINE 25 MG tablet  Commonly known as: APRESOLINE  Take 3 tablets (75 mg total) by mouth every 12 (twelve) hours.  What changed: how much to take            CONTINUE taking these medications      losartan-hydrochlorothiazide 100-25 mg 100-25 mg per tablet  Commonly known as: HYZAAR  Take 1 tablet by mouth once daily.            STOP taking these medications      amoxicillin 500 MG Tab  Commonly known as: AMOXIL     estradioL 0.01 % (0.1 mg/gram) vaginal cream  Commonly known as: ESTRACE     LORazepam 0.5 MG tablet  Commonly known as: ATIVAN     metoprolol tartrate 100 MG tablet  Commonly known as: LOPRESSOR     nitrofurantoin (macrocrystal-monohydrate) 100 MG capsule  Commonly known as: MACROBID     verapamiL 240 MG CR tablet  Commonly known as: CALAN-SR              Indwelling Lines/Drains at time of discharge:   Lines/Drains/Airways       None                   Time spent on the discharge of patient: 45 minutes         Jerica Quick NP  Department of Hospital Medicine  [facility] - Telemetry ([facility])

## 2024-04-20 NOTE — PT/OT/SLP EVAL
"Occupational Therapy   Evaluation    Name: Janet Hamilton  MRN: [id number]  Admitting Diagnosis: Syncope  Recent Surgery: * No surgery found *      Recommendations:     Discharge Recommendations: Low Intensity Therapy  Discharge Equipment Recommendations:  none  Barriers to discharge:  Decreased caregiver support, Other (Comment) (PATIENT'S SISTER STAYS WITH HER AT TIMES.  PATIENT IN 2-STORY HOME WITH BEDROOM AND BATHROOM DOWNSTAIRS.)    Assessment:     Janet Hamilton is a 78 y.o. female with a medical diagnosis of Syncope.  She presents with SELF CARE DEBILITY . Performance deficits affecting function: weakness, impaired endurance, impaired self care skills, impaired functional mobility, gait instability, impaired balance, decreased upper extremity function, decreased lower extremity function.      Rehab Prognosis: Good; patient would benefit from acute skilled OT services to address these deficits and reach maximum level of function.       Plan:     Patient to be seen 2 x/week to address the above listed problems via self-care/home management, therapeutic activities, therapeutic exercises  Plan of Care Expires: 05/04/24  Plan of Care Reviewed with: patient    Subjective     Chief Complaint: WEAKNESS DUE TO DECREASED TIME OOB & PREMORBID BLADDER URGENCY.  Patient/Family Comments/goals: TO INCREASE STRENGTH AND ENDURANCE.    Occupational Profile:  Living Environment: PATIENT LIVES ALONE IN A 2 STORY HOUSE WITH BEDROOM AND BATHROOM DOWNSTAIRS WITH 0 STEPS AT ENTRANCE. PATIENT STATED HER SISTER MAY STAY WITH HER AT TIMES.  Previous level of function: PATIENT STATED SHE WAS (I) WITH ALL LEVELS OF SELF CARE AND WAS ABLE TO DRIVE "AROUND TOWN."     Roles and Routines: PATIENT HAS A COMMODE WHICH SHE DOES NOT USE AND USES A RW ONLY WHEN NEEDED.  Equipment Used at Home: walker, rolling, bedside commode (PATIENT STATED SHE HAS A COMMODE BUT DOES NOT USE IT AND USES RW ONLY AS NEEDED.)  Assistance upon Discharge: " "POSSIBLY HER SISTER.    Pain/Comfort:  Pain Rating 1: 0/10    Patients cultural, spiritual, Baptism conflicts given the current situation:      Objective:     Communicated with: NURSE NGHIA prior to session.  Patient found HOB elevated with PureWick, telemetry upon OT entry to room.    General Precautions: Standard, fall  Orthopedic Precautions: N/A  Braces: N/A  Respiratory Status: Room air    Occupational Performance:    Bed Mobility:    Patient completed Rolling/Turning to Left with  stand by assistance  Patient completed Rolling/Turning to Right with stand by assistance  Patient completed Supine to Sit with stand by assistance  Patient completed Sit to Supine with stand by assistance    Functional Mobility/Transfers:  Patient completed Sit <> Stand Transfer with stand by assistance  with  rolling walker   Patient completed Bed <> Chair Transfer using Stand Pivot technique with contact guard assistance with rolling walker  Functional Mobility: CGA WITH RW.    Activities of Daily Living:  Feeding:  independence    Grooming: independence TO FINGER COMB HAIR DUE TO NOT HAVING HER COMB AND BRUSH.  Upper Body Dressing: minimum assistance WITH HOSPITAL GOWN AS ROBE  Lower Body Dressing: moderate assistance WITH FOOTWEAR MANAGEMENT WITH SOCKS.  ___CORRECTION FOR UB DRESSING:  MOD (A) WITH HOSPITAL GOWNS DUE TO IV'S.    Cognitive/Visual Perceptual:  NO DEFICITS NOTED.    Physical Exam:  Balance:    -       CGA WITH RW DUE TO C/O WEAKNESS.  Upper Extremity Range of Motion:     -       Right Upper Extremity: WFL  -       Left Upper Extremity: WFL    AMPAC 6 Click ADL:  AMPAC Total Score: 20    Treatment & Education:  OT EVAL PERFORMED.  PATIENT EDUCATED RE:  PURPOSE OF OT AND IMPORTANCE OF "CALL--DON'T FALL" TO DECREASE FALL RISK.  PATIENT PARTICIPATED IN [facility] MOBILITY AND SELF CARE TASKS.     Patient left HOB elevated with all lines intact and call button in reach    GOALS:   Multidisciplinary Problems       " Occupational Therapy Goals          Problem: Occupational Therapy    Goal Priority Disciplines Outcome Interventions   Occupational Therapy Goal     OT, PT/OT     Description: LTG'S TO BE MET IN 14 DAYS (5/4/24)    1)  PATIENT WILL PERFORM UB DRESSING WITH SET UP TO DECREASE (D) ON CAREGIVER.    2)  PATIENT WILL PERFORM LB DRESSING WITH SBA TO DECREASE (D) ON CAREGIVER..    3)  PATIENT WILL PERFORM TOILET T/F WITH SBA TO DECREASE (D) ON CAREGIVER..    4)  PATIENT WILL PERFORM BUE HEP WITH (S) FOR PROPER TECHNIQUE TO INCREASE FUNC STRENGTH AND FUNC ENDURANCE TO DECREASE (D) ON CAREGIVER TO SAFELY PERFORM SELF CARE TASKS.                         History:     Past Medical History:   Diagnosis Date    Anemia     Anxiety     Bronchitis     Enlarged heart     Hyperlipidemia     Hypertension          Past Surgical History:   Procedure Laterality Date    TUBAL LIGATION      VAGINAL PROLAPSE REPAIR  09/10/2013    Uterus prolapse/ [facility]       Time Tracking:     OT Date of Treatment: 04/20/24  OT Start Time: 1000  OT Stop Time: 1023  OT Total Time (min): 23 min    Billable Minutes:Evaluation 10  Therapeutic Activity 13    4/20/2024

## 2024-04-20 NOTE — PT/OT/SLP EVAL
"Physical Therapy Evaluation    Patient Name:  Janet Hamilton   MRN:  [id number]    Recommendations:     Discharge Recommendations: Low Intensity Therapy   Discharge Equipment Recommendations: none   Barriers to discharge: None    Assessment:     Janet Hamilton is a 78 y.o. female admitted with a medical diagnosis of Syncope.  She presents with the following impairments/functional limitations: weakness, impaired endurance, impaired functional mobility, gait instability, impaired balance, decreased lower extremity function, decreased safety awareness which negatively impacts functional status, increases risk of falls, and increases risk of issues related to immobility. Pt participated well in PT services, reported increased weakness. Required increased time for all tasks. Pt has good caregiver assist. Pt will benefit from continued PT services at low intensity in order to return to baseline.    Rehab Prognosis: Good; patient would benefit from acute skilled PT services to address these deficits and reach maximum level of function.    Recent Surgery: * No surgery found *      Plan:     During this hospitalization, patient to be seen 3 x/week to address the identified rehab impairments via gait training, therapeutic activities, therapeutic exercises, neuromuscular re-education and progress toward the following goals:    Plan of Care Expires:  05/04/24    Subjective     Chief Complaint: no pain "why shoulder blade was hurting but it's better"  Patient/Family Comments/goals: to get better/go home  Pain/Comfort:  Pain Rating 1: 0/10  Pain Rating Post-Intervention 1: 0/10    Patients cultural, spiritual, Taoism conflicts given the current situation: no    Living Environment:  Pt lives with sister in town home with 2 floors, no steps at entry, bed and bath on ground floor  Prior to admission, patients level of function was independent with ADLs, ambulatory in home and community, no longer works, drives short " distances.  Equipment used at home: shower chair.  DME owned (not currently used): RW, bedside commode.  Upon discharge, patient will have assistance from sister and nephews.    Objective:     Communicated with nursing (Shahriar) and performed chart review via epic system prior to session.  Patient found supine with peripheral IV, telemetry  upon PT entry to room.    General Precautions: Standard, fall  Orthopedic Precautions:N/A   Braces: N/A  Respiratory Status: Room air    Exams:  Cognitive Exam:  Patient is oriented to Person, Place, Time, and Situation  Gross Motor Coordination:  WFL  Postural Exam:  Patient presented with the following abnormalities:    -       Rounded shoulders  -       Forward head  RLE ROM: WFL  RLE Strength: WFL  LLE ROM: WFL  LLE Strength: WFL    Functional Mobility:  Bed Mobility:     Scooting: stand by assistance  Supine to Sit: stand by assistance  Tolerated sitting EOB x 7-10 mins with no LOB, increased time needed for all tasks.   Sit to Supine: stand by assistance  Transfers:     Sit to Stand:  contact guard assistance with rolling walker  Bed to Chair: contact guard assistance with  rolling walker  using  Stand Pivot  Gait: 10 ft x 4 with CGA and RW, demonstrated slow pace, flexed posture, wide KOMAL, decreased step length and foot clearance  Balance: fair dynamic standing balance      AM-PAC 6 CLICK MOBILITY  Total Score:16       Treatment & Education:  Educated pt on benefits of consistent participation in PT services to meet functional goals. Educated pt on call don't fall policy and use of call button to alert nursing staff of needs (including to assist in/out of  bed). Pt expressed understanding.     Patient left up in chair with all lines intact, call button in reach, chair alarm on, and nursing notified.    GOALS:   Multidisciplinary Problems       Physical Therapy Goals          Problem: Physical Therapy    Goal Priority Disciplines Outcome Goal Variances Interventions   Physical  Therapy Goal     PT, PT/OT      Description: Pt will perform bed mobility independently in order to participate in EOB activity.  Pt will perform transfers independently in order to participate in OOB activity.  Pt will ambulate 350 ft mod I with LRAD in order to participate in daily tasks.                        History:     Past Medical History:   Diagnosis Date    Anemia     Anxiety     Bronchitis     Enlarged heart     Hyperlipidemia     Hypertension        Past Surgical History:   Procedure Laterality Date    TUBAL LIGATION      VAGINAL PROLAPSE REPAIR  09/10/2013    Uterus prolapse/ [facility]       Time Tracking:     PT Received On: 04/20/24  PT Start Time: 0950     PT Stop Time: 1022  PT Total Time (min): 32 min     Billable Minutes: Evaluation 15 and Gait Training 17      04/20/2024

## 2024-04-20 NOTE — PLAN OF CARE
[facility] - Telemetry (Hospital)  Discharge Final Note    Primary Care Provider: Omid Morris MD    Expected Discharge Date: 4/20/2024    Final Discharge Note (most recent)       Final Note - 04/20/24 1534          Final Note    Assessment Type Final Discharge Note     Anticipated Discharge Disposition Home or Self Care        Post-Acute Status    Discharge Delays None known at this time                     Important Message from Medicare             Contact Info       Omid Morris MD   Specialty: Pathology   Relationship: PCP - General    [address], [address]  [facility] [address]   Phone: [phone]       Next Steps: Follow up in 3 day(s)    Instructions: BP recheck    Diana Huang MD   Specialty: Cardiology    [address], [address] Floor  [facility] [address]   Phone: [phone]       Next Steps: Follow up    Instructions: follow up with Cardiology regarding low heart rate and Holter monitor

## 2024-04-20 NOTE — PLAN OF CARE
EVAL AND TX COMPLETED: facilitated bed mobility with SBA, transfers with CGA. Ambulated 10 ft with x 4 CGA and RW. Recommend low intensity PT services.

## 2024-04-20 NOTE — PROGRESS NOTES
Discharge education and instructions reviewed with pt. Questions answered. LDA's and tele monitor removed per order. Pt remained free from falls during shift. Discharge medications sent to pt pharmacy of choice. Transport requested. Pt discharged with personal belongings.

## 2024-04-26 ENCOUNTER — TELEPHONE (OUTPATIENT)
Dept: CARDIOLOGY | Facility: HOSPITAL | Age: 79
End: 2024-04-26
Payer: MEDICARE

## 2024-04-26 DIAGNOSIS — R00.1 BRADYCARDIA: Primary | ICD-10-CM

## 2024-04-26 NOTE — TELEPHONE ENCOUNTER
Attempted to contact pt and offer HFU and VC appts, no answer, LVM    ---- SWETHA Cano 04/26/24 03:18 PM ----  Please offer hospital f/u with [facility]. Any provider next week.    Thanks

## 2024-05-29 ENCOUNTER — TELEPHONE (OUTPATIENT)
Dept: CARDIOLOGY | Facility: HOSPITAL | Age: 79
End: 2024-05-29
Payer: MEDICARE

## 2024-08-20 ENCOUNTER — HOSPITAL ENCOUNTER (OUTPATIENT)
Dept: RADIOLOGY | Facility: HOSPITAL | Age: 79
Discharge: HOME | End: 2024-08-20
Attending: INTERNAL MEDICINE
Payer: MEDICARE

## 2024-08-20 VITALS — WEIGHT: 138 LBS | HEIGHT: 62 IN | BODY MASS INDEX: 25.4 KG/M2

## 2024-08-20 DIAGNOSIS — Z12.31 ENCOUNTER FOR SCREENING MAMMOGRAM FOR MALIGNANT NEOPLASM OF BREAST: ICD-10-CM

## 2024-08-20 PROCEDURE — 77067 SCR MAMMO BI INCL CAD: CPT | Mod: TC,PO

## 2024-08-20 PROCEDURE — 77063 BREAST TOMOSYNTHESIS BI: CPT | Mod: TC,PO

## 2024-08-20 PROCEDURE — 77067 SCR MAMMO BI INCL CAD: CPT | Mod: 26,,, | Performed by: RADIOLOGY

## 2024-08-20 PROCEDURE — 77063 BREAST TOMOSYNTHESIS BI: CPT | Mod: 26,,, | Performed by: RADIOLOGY

## 2024-09-06 ENCOUNTER — HOSPITAL ENCOUNTER (OUTPATIENT)
Dept: RADIOLOGY | Facility: HOSPITAL | Age: 79
Discharge: HOME | End: 2024-09-06
Attending: INTERNAL MEDICINE
Payer: MEDICARE

## 2024-09-06 DIAGNOSIS — R92.8 ABNORMAL MAMMOGRAM: ICD-10-CM

## 2024-09-06 PROCEDURE — 76642 ULTRASOUND BREAST LIMITED: CPT | Mod: TC,RT

## 2024-09-06 PROCEDURE — 77061 BREAST TOMOSYNTHESIS UNI: CPT | Mod: TC,RT

## 2024-11-11 ENCOUNTER — HOSPITAL ENCOUNTER (EMERGENCY)
Facility: HOSPITAL | Age: 79
Discharge: HOME | End: 2024-11-11
Attending: EMERGENCY MEDICINE
Payer: MEDICARE

## 2024-11-11 VITALS
SYSTOLIC BLOOD PRESSURE: 134 MMHG | OXYGEN SATURATION: 99 % | HEART RATE: 84 BPM | TEMPERATURE: 98 F | BODY MASS INDEX: 26.83 KG/M2 | DIASTOLIC BLOOD PRESSURE: 62 MMHG | WEIGHT: 146.69 LBS | RESPIRATION RATE: 16 BRPM

## 2024-11-11 DIAGNOSIS — M54.14 THORACIC RADICULOPATHY: Primary | ICD-10-CM

## 2024-11-11 PROCEDURE — 63600175 PHARM REV CODE 636 W HCPCS: Mod: ER | Performed by: EMERGENCY MEDICINE

## 2024-11-11 PROCEDURE — 96372 THER/PROPH/DIAG INJ SC/IM: CPT | Performed by: EMERGENCY MEDICINE

## 2024-11-11 PROCEDURE — 99284 EMERGENCY DEPT VISIT MOD MDM: CPT | Mod: 25,ER

## 2024-11-11 RX ORDER — KETOROLAC TROMETHAMINE 30 MG/ML
30 INJECTION, SOLUTION INTRAMUSCULAR; INTRAVENOUS
Status: COMPLETED | OUTPATIENT
Start: 2024-11-11 | End: 2024-11-11

## 2024-11-11 RX ADMIN — KETOROLAC TROMETHAMINE 30 MG: 30 INJECTION, SOLUTION INTRAMUSCULAR at 01:11

## 2024-11-11 NOTE — ED PROVIDER NOTES
Encounter Date: 11/11/2024       History     Chief Complaint   Patient presents with    Neck Pain     Left side neck pain that extends down into shoulder and arm x 3 days. Getting mild relief with tylenol arthritis. Worse with movement.      77 y/o F with 3 days of left shoulder and shoulder blade pain. This radiates down the left arm. Denies any numbness, weakness, trauma, chest pain, SOB, fever, chills. Tylenol mildly improves pain.     The history is provided by the patient.     Review of patient's allergies indicates:  No Known Allergies  Past Medical History:   Diagnosis Date    Anemia     Anxiety     Bronchitis     Enlarged heart     Hyperlipidemia     Hypertension      Past Surgical History:   Procedure Laterality Date    HYSTERECTOMY      OOPHORECTOMY      TUBAL LIGATION      VAGINAL PROLAPSE REPAIR  09/10/2013    Uterus prolapse/ [facility]     Family History   Problem Relation Name Age of Onset    Breast cancer Maternal Aunt      Stroke Brother       Social History     Tobacco Use    Smoking status: Never    Smokeless tobacco: Never   Substance Use Topics    Alcohol use: Yes     Comment: occasional     Drug use: No     Review of Systems   Constitutional:  Negative for diaphoresis and fever.   HENT:  Negative for congestion, dental problem and sore throat.    Eyes:  Negative for pain and visual disturbance.   Respiratory:  Negative for cough and shortness of breath.    Cardiovascular:  Negative for chest pain and palpitations.   Gastrointestinal:  Negative for abdominal pain, diarrhea, nausea and vomiting.   Genitourinary:  Negative for dysuria and flank pain.   Musculoskeletal:  Positive for back pain. Negative for neck pain.   Skin:  Negative for rash and wound.   Neurological:  Negative for weakness, numbness and headaches.   Psychiatric/Behavioral:  Negative for agitation and confusion.        Physical Exam     Initial Vitals [11/11/24 1112]   BP Pulse Resp Temp SpO2   (!) 144/68 91 16 98 °F (36.7 °C) 99  %      MAP       --         Physical Exam    Constitutional: She appears well-developed and well-nourished.   HENT:   Head: Normocephalic and atraumatic.   Eyes: EOM are normal. Pupils are equal, round, and reactive to light.   Neck: Neck supple.   Normal range of motion.  Cardiovascular:  Normal rate and regular rhythm.           Pulmonary/Chest: Breath sounds normal. No respiratory distress.   Abdominal: She exhibits no distension. There is no abdominal tenderness.   Musculoskeletal:      Cervical back: Normal range of motion and neck supple.      Comments: There is left paraspinal muscle tenderness. No shoulder tenderness. No deformity. No midline tenderness of the C, T, or L spine.      Neurological: She is alert and oriented to person, place, and time. She has normal strength. No sensory deficit.   Skin: Skin is warm and dry.   Psychiatric: She has a normal mood and affect.         ED Course   Procedures  Labs Reviewed - No data to display       Imaging Results              X-Ray Shoulder 2 or More Views Left (Final result)  Result time 11/11/24 13:31:12      Final result by Jennifer DominguezAlexis), MD (11/11/24 13:31:12)                   Impression:      Negative exam.      Electronically signed by: Jennifer Doimnguez MD  Date:    11/11/2024  Time:    13:31               Narrative:    EXAMINATION:  XR SHOULDER COMPLETE 2 OR MORE VIEWS LEFT    CLINICAL HISTORY:  Left shoulder pain;    TECHNIQUE:  Standard radiography performed.  Three views    COMPARISON:  None    FINDINGS:  Bone density and architecture are normal.  No acute findings.                                       Medications   ketorolac injection 30 mg (30 mg Intramuscular Given 11/11/24 1305)     Medical Decision Making  DDx includes thoracic strain, radiculopathy, DDD    Amount and/or Complexity of Data Reviewed  Radiology: ordered. Decision-making details documented in ED Course.    Risk  Prescription drug management.               ED Course as of  11/11/24 1718   Mon Nov 11, 2024   1356 1:56 PM Reassessment: I reassessed the pt.  The pt is resting comfortably and is NAD.  Pt states their sx have improved. Discussed test results, shared treatment plan, specific conditions for return, and the need for f/u.  Answered their questions at this time.  Pt understands and agrees to the plan.  The pt has remained hemodynamically stable through ED course and is stable for discharge.    [BA]      ED Course User Index  [BA] Regan Cueto MD                           Clinical Impression:  Final diagnoses:  [M54.14] Thoracic radiculopathy (Primary)          ED Disposition Condition    Discharge Stable          ED Prescriptions    None       Follow-up Information       Follow up With Specialties Details Why Contact Info    Your Primary Care Provider  Schedule an appointment as soon as possible for a visit in 2 days For re-evaluation and further treatment     [facility] - Emergency Dept Emergency Medicine Go today If symptoms worsen, For re-evaluation and further treatment, As needed [id number]  Emergency Department  [facility] [id number]  [phone]             Regan Cueto MD  11/11/24 1718

## 2025-01-17 ENCOUNTER — LAB VISIT (OUTPATIENT)
Dept: LAB | Facility: HOSPITAL | Age: 80
End: 2025-01-17
Attending: INTERNAL MEDICINE
Payer: MEDICARE

## 2025-01-17 DIAGNOSIS — R73.03 DIABETES MELLITUS, LATENT: ICD-10-CM

## 2025-01-17 DIAGNOSIS — I10 ESSENTIAL HYPERTENSION, MALIGNANT: Primary | ICD-10-CM

## 2025-01-17 DIAGNOSIS — I10 ESSENTIAL HYPERTENSION, MALIGNANT: ICD-10-CM

## 2025-01-17 LAB
ALBUMIN SERPL BCP-MCNC: 3.8 G/DL (ref 3.5–5.2)
ALP SERPL-CCNC: 45 U/L (ref 40–150)
ALT SERPL W/O P-5'-P-CCNC: 12 U/L (ref 10–44)
ANION GAP SERPL CALC-SCNC: 8 MMOL/L (ref 8–16)
AST SERPL-CCNC: 18 U/L (ref 10–40)
BASOPHILS # BLD AUTO: 0.06 K/UL (ref 0–0.2)
BASOPHILS NFR BLD: 1 % (ref 0–1.9)
BILIRUB SERPL-MCNC: 0.5 MG/DL (ref 0.1–1)
BUN SERPL-MCNC: 26 MG/DL (ref 8–23)
CALCIUM SERPL-MCNC: 9.1 MG/DL (ref 8.7–10.5)
CHLORIDE SERPL-SCNC: 108 MMOL/L (ref 95–110)
CHOLEST SERPL-MCNC: 168 MG/DL (ref 120–199)
CHOLEST/HDLC SERPL: 2.8 {RATIO} (ref 2–5)
CO2 SERPL-SCNC: 26 MMOL/L (ref 23–29)
CREAT SERPL-MCNC: 1 MG/DL (ref 0.5–1.4)
DIFFERENTIAL METHOD BLD: ABNORMAL
EOSINOPHIL # BLD AUTO: 0.1 K/UL (ref 0–0.5)
EOSINOPHIL NFR BLD: 1.4 % (ref 0–8)
ERYTHROCYTE [DISTWIDTH] IN BLOOD BY AUTOMATED COUNT: 18.1 % (ref 11.5–14.5)
EST. GFR  (NO RACE VARIABLE): 57 ML/MIN/1.73 M^2
ESTIMATED AVG GLUCOSE: 108 MG/DL (ref 68–131)
GLUCOSE SERPL-MCNC: 91 MG/DL (ref 70–110)
HBA1C MFR BLD: 5.4 % (ref 4–5.6)
HCT VFR BLD AUTO: 29.3 % (ref 37–48.5)
HDLC SERPL-MCNC: 59 MG/DL (ref 40–75)
HDLC SERPL: 35.1 % (ref 20–50)
HGB BLD-MCNC: 9.4 G/DL (ref 12–16)
IMM GRANULOCYTES # BLD AUTO: 0.03 K/UL (ref 0–0.04)
IMM GRANULOCYTES NFR BLD AUTO: 0.5 % (ref 0–0.5)
LDLC SERPL CALC-MCNC: 102 MG/DL (ref 63–159)
LYMPHOCYTES # BLD AUTO: 1.3 K/UL (ref 1–4.8)
LYMPHOCYTES NFR BLD: 20.6 % (ref 18–48)
MCH RBC QN AUTO: 28.6 PG (ref 27–31)
MCHC RBC AUTO-ENTMCNC: 32.1 G/DL (ref 32–36)
MCV RBC AUTO: 89 FL (ref 82–98)
MONOCYTES # BLD AUTO: 1 K/UL (ref 0.3–1)
MONOCYTES NFR BLD: 16.3 % (ref 4–15)
NEUTROPHILS # BLD AUTO: 3.8 K/UL (ref 1.8–7.7)
NEUTROPHILS NFR BLD: 60.2 % (ref 38–73)
NONHDLC SERPL-MCNC: 109 MG/DL
NRBC BLD-RTO: 0 /100 WBC
PLATELET # BLD AUTO: 211 K/UL (ref 150–450)
PMV BLD AUTO: 9.7 FL (ref 9.2–12.9)
POTASSIUM SERPL-SCNC: 3.6 MMOL/L (ref 3.5–5.1)
PROT SERPL-MCNC: 7.8 G/DL (ref 6–8.4)
RBC # BLD AUTO: 3.29 M/UL (ref 4–5.4)
SODIUM SERPL-SCNC: 142 MMOL/L (ref 136–145)
TRIGL SERPL-MCNC: 35 MG/DL (ref 30–150)
TSH SERPL DL<=0.005 MIU/L-ACNC: 2.91 UIU/ML (ref 0.4–4)
WBC # BLD AUTO: 6.3 K/UL (ref 3.9–12.7)

## 2025-01-17 PROCEDURE — 85025 COMPLETE CBC W/AUTO DIFF WBC: CPT | Mod: PO | Performed by: INTERNAL MEDICINE

## 2025-01-17 PROCEDURE — 84443 ASSAY THYROID STIM HORMONE: CPT | Mod: PO | Performed by: INTERNAL MEDICINE

## 2025-01-17 PROCEDURE — 80053 COMPREHEN METABOLIC PANEL: CPT | Mod: PO | Performed by: INTERNAL MEDICINE

## 2025-01-17 PROCEDURE — 83036 HEMOGLOBIN GLYCOSYLATED A1C: CPT | Performed by: INTERNAL MEDICINE

## 2025-01-17 PROCEDURE — 36415 COLL VENOUS BLD VENIPUNCTURE: CPT | Mod: PO | Performed by: INTERNAL MEDICINE

## 2025-01-17 PROCEDURE — 80061 LIPID PANEL: CPT | Performed by: INTERNAL MEDICINE
